# Patient Record
Sex: MALE | Race: WHITE | Employment: FULL TIME | URBAN - METROPOLITAN AREA
[De-identification: names, ages, dates, MRNs, and addresses within clinical notes are randomized per-mention and may not be internally consistent; named-entity substitution may affect disease eponyms.]

---

## 2017-05-18 ENCOUNTER — DOCUMENTATION ONLY (OUTPATIENT)
Dept: LAB | Facility: CLINIC | Age: 67
End: 2017-05-18

## 2017-05-18 DIAGNOSIS — Z00.00 ENCOUNTER FOR ROUTINE ADULT HEALTH EXAMINATION WITHOUT ABNORMAL FINDINGS: Primary | ICD-10-CM

## 2017-05-18 NOTE — PROGRESS NOTES
Pt is coming for PV physical lab on 5/26/17. I have pended future orders. Please review, associate diagnosis, and sign pended order.     Thanks, Lab

## 2017-05-30 DIAGNOSIS — Z00.00 ENCOUNTER FOR ROUTINE ADULT HEALTH EXAMINATION WITHOUT ABNORMAL FINDINGS: ICD-10-CM

## 2017-05-30 LAB
ALBUMIN SERPL-MCNC: 3.9 G/DL (ref 3.4–5)
ALP SERPL-CCNC: 90 U/L (ref 40–150)
ALT SERPL W P-5'-P-CCNC: 19 U/L (ref 0–70)
ANION GAP SERPL CALCULATED.3IONS-SCNC: 7 MMOL/L (ref 3–14)
AST SERPL W P-5'-P-CCNC: 19 U/L (ref 0–45)
BILIRUB SERPL-MCNC: 1 MG/DL (ref 0.2–1.3)
BUN SERPL-MCNC: 20 MG/DL (ref 7–30)
CALCIUM SERPL-MCNC: 8.6 MG/DL (ref 8.5–10.1)
CHLORIDE SERPL-SCNC: 105 MMOL/L (ref 94–109)
CHOLEST SERPL-MCNC: 198 MG/DL
CO2 SERPL-SCNC: 29 MMOL/L (ref 20–32)
CREAT SERPL-MCNC: 0.84 MG/DL (ref 0.66–1.25)
ERYTHROCYTE [DISTWIDTH] IN BLOOD BY AUTOMATED COUNT: 12.9 % (ref 10–15)
GFR SERPL CREATININE-BSD FRML MDRD: NORMAL ML/MIN/1.7M2
GLUCOSE SERPL-MCNC: 77 MG/DL (ref 70–99)
HCT VFR BLD AUTO: 44.5 % (ref 40–53)
HDLC SERPL-MCNC: 70 MG/DL
HGB BLD-MCNC: 14.6 G/DL (ref 13.3–17.7)
LDLC SERPL CALC-MCNC: 121 MG/DL
MCH RBC QN AUTO: 30.8 PG (ref 26.5–33)
MCHC RBC AUTO-ENTMCNC: 32.8 G/DL (ref 31.5–36.5)
MCV RBC AUTO: 94 FL (ref 78–100)
NONHDLC SERPL-MCNC: 128 MG/DL
PLATELET # BLD AUTO: 217 10E9/L (ref 150–450)
POTASSIUM SERPL-SCNC: 3.9 MMOL/L (ref 3.4–5.3)
PROT SERPL-MCNC: 6.9 G/DL (ref 6.8–8.8)
PSA SERPL-MCNC: 0.01 UG/L (ref 0–4)
RBC # BLD AUTO: 4.74 10E12/L (ref 4.4–5.9)
SODIUM SERPL-SCNC: 141 MMOL/L (ref 133–144)
TRIGL SERPL-MCNC: 37 MG/DL
WBC # BLD AUTO: 3.5 10E9/L (ref 4–11)

## 2017-05-30 PROCEDURE — 86803 HEPATITIS C AB TEST: CPT | Performed by: PREVENTIVE MEDICINE

## 2017-05-30 PROCEDURE — 36415 COLL VENOUS BLD VENIPUNCTURE: CPT | Performed by: PREVENTIVE MEDICINE

## 2017-05-30 PROCEDURE — 80061 LIPID PANEL: CPT | Performed by: PREVENTIVE MEDICINE

## 2017-05-30 PROCEDURE — 85027 COMPLETE CBC AUTOMATED: CPT | Performed by: PREVENTIVE MEDICINE

## 2017-05-30 PROCEDURE — 84153 ASSAY OF PSA TOTAL: CPT | Performed by: PREVENTIVE MEDICINE

## 2017-05-30 PROCEDURE — 80053 COMPREHEN METABOLIC PANEL: CPT | Performed by: PREVENTIVE MEDICINE

## 2017-05-31 LAB — HCV AB SERPL QL IA: NORMAL

## 2017-06-01 NOTE — PROGRESS NOTES
SUBJECTIVE:                                                            Jacek Beard is a 67 year old male who presents for Preventive Visit.      Are you in the first 12 months of your Medicare Part B coverage?  No    Healthy Habits:    Do you get at least three servings of calcium containing foods daily (dairy, green leafy vegetables, etc.)? No-1     Amount of exercise or daily activities, outside of work: 7 day(s) per week stays very active golfing, fitness club     Problems taking medications regularly No    Medication side effects: No    Have you had an eye exam in the past two years? yes    Do you see a dentist twice per year? yes    Do you have sleep apnea, excessive snoring or daytime drowsiness?no    COGNITIVE SCREEN  1) Repeat 3 items (Banana, Sunrise, Chair)    2) Clock draw: NORMAL  3) 3 item recall: Recalls 3 objects  Results: 3 items recalled: COGNITIVE IMPAIRMENT LESS LIKELY    Mini-CogTM Copyright S Rigo. Licensed by the author for use in [facility]; reprinted with permission (thomas@[facility]). All rights reserved.                Reviewed and updated as needed this visit by clinical staff         Reviewed and updated as needed this visit by Provider        Social History   Substance Use Topics     Smoking status: Never Smoker     Smokeless tobacco: Never Used     Alcohol use 2.4 - 3.0 oz/week     4 - 5 Standard drinks or equivalent per week      Comment: 2 beers  a couple times a week       The patient does not drink >3 drinks per day nor >7 drinks per week.    Today's PHQ-2 Score:   PHQ-2 ( 1999 Pfizer) 5/24/2016 6/27/2014   Q1: Little interest or pleasure in doing things 0 0   Q2: Feeling down, depressed or hopeless 0 0   PHQ-2 Score 0 0       Do you feel safe in your environment - YES    Do you have a Health Care Directive?: No: Advance care planning reviewed with patient; information given to patient to review.    Current providers sharing in care for this patient include:    Patient Care Team:  Corey Bhatia MD as PCP - General (Internal Medicine)      Hearing impairment: Yes, slight decreased hearing.R>L    Ability to successfully perform activities of daily living: Yes, no assistance needed     Fall risk:0    Home safety:  none identified      The following health maintenance items are reviewed in Epic and correct as of today:  Health Maintenance   Topic Date Due     FALL RISK ASSESSMENT  02/25/2017     PNEUMOCOCCAL (1 of 2 - PCV13) 02/25/2017     COLON CANCER SCREEN (SYSTEM ASSIGNED)  07/01/2017     INFLUENZA VACCINE (SYSTEM ASSIGNED)  09/01/2017     ADVANCE DIRECTIVE PLANNING Q5 YRS  10/18/2017     LIPID SCREEN Q5 YR MALE (SYSTEM ASSIGNED)  05/30/2022     TETANUS IMMUNIZATION (SYSTEM ASSIGNED)  12/21/2025     AORTIC ANEURYSM SCREENING (SYSTEM ASSIGNED)  Completed     HEPATITIS C SCREENING  Completed              ROS:  Constitutional, HEENT, cardiovascular, pulmonary, GI, , musculoskeletal, neuro, skin, endocrine and psych systems are negative, except as otherwise noted.    Problem list, Medication list, Allergies, and Medical/Social/Surgical histories reviewed in [facility] and updated as appropriate.  OBJECTIVE:                                                            There were no vitals taken for this visit. Estimated body mass index is 21.7 kg/(m^2) as calculated from the following:    Height as of 10/28/16: 6' (1.829 m).    Weight as of 10/28/16: 160 lb (72.6 kg).  EXAM:   GENERAL: healthy, alert and no distress  EYES: Eyes grossly normal to inspection, PERRL and conjunctivae and sclerae normal  HENT: ear canals and TM's normal, nose and mouth without ulcers or lesions  NECK: no adenopathy, no asymmetry, masses, or scars and thyroid normal to palpation  RESP: lungs clear to auscultation - no rales, rhonchi or wheezes  CV: regular rate and rhythm, normal S1 S2, no S3 or S4, no murmur, click or rub, no peripheral edema and peripheral pulses strong  ABDOMEN:  soft, nontender, no hepatosplenomegaly, no masses and bowel sounds normal  MS: no gross musculoskeletal defects noted, no edema  SKIN: no suspicious lesions or rashes  NEURO: Normal strength and tone, mentation intact and speech normal  PSYCH: mentation appears normal, affect normal/bright    ASSESSMENT / PLAN:                                                            1. Screen for colon cancer    - GASTROENTEROLOGY ADULT REF PROCEDURE ONLY    2. Need for prophylactic vaccination against Streptococcus pneumoniae (pneumococcus)      3. Routine history and physical examination of adult    - EKG 12-lead complete w/read - Clinics    End of Life Planning:  Patient currently has an advanced directive: Yes.  Practitioner is supportive of decision.    COUNSELING:  Reviewed preventive health counseling, as reflected in patient instructions       Regular exercise       Healthy diet/nutrition       Vision screening       Hearing screening       Dental care       Safe sex practices/STD prevention       Hepatitis C screening       HIV screening for high risk patient       Consider lung cancer screening for ages 55-80 years and 30 pack-year smoking history        Colon cancer screening       Prostate cancer screening        Estimated body mass index is 21.7 kg/(m^2) as calculated from the following:    Height as of 10/28/16: 6' (1.829 m).    Weight as of 10/28/16: 160 lb (72.6 kg).     reports that he has never smoked. He has never used smokeless tobacco.      Appropriate preventive services were discussed with this patient, including applicable screening as appropriate for cardiovascular disease, diabetes, osteopenia/osteoporosis, and glaucoma.  As appropriate for age/gender, discussed screening for colorectal cancer, prostate cancer, breast cancer, and cervical cancer. Checklist reviewing preventive services available has been given to the patient.    Reviewed patients plan of care and provided an AVS. The Basic Care Plan  (routine screening as documented in Health Maintenance) for Jacek meets the Care Plan requirement. This Care Plan has been established and reviewed with the Patient.    Counseling Resources:  ATP IV Guidelines  Pooled Cohorts Equation Calculator  Breast Cancer Risk Calculator  FRAX Risk Assessment  ICSI Preventive Guidelines  Dietary Guidelines for Americans, 2010  USDA's MyPlate  ASA Prophylaxis  Lung CA Screening    Corey Bhatia MD, MD  [facility]

## 2017-06-02 ENCOUNTER — OFFICE VISIT (OUTPATIENT)
Dept: FAMILY MEDICINE | Facility: CLINIC | Age: 67
End: 2017-06-02
Payer: MEDICARE

## 2017-06-02 VITALS
HEIGHT: 71 IN | DIASTOLIC BLOOD PRESSURE: 83 MMHG | BODY MASS INDEX: 22.26 KG/M2 | TEMPERATURE: 97.8 F | OXYGEN SATURATION: 99 % | SYSTOLIC BLOOD PRESSURE: 124 MMHG | HEART RATE: 74 BPM | WEIGHT: 159 LBS

## 2017-06-02 DIAGNOSIS — Z00.00 ROUTINE HISTORY AND PHYSICAL EXAMINATION OF ADULT: Primary | ICD-10-CM

## 2017-06-02 DIAGNOSIS — H61.20 WAX IN EAR: ICD-10-CM

## 2017-06-02 DIAGNOSIS — Z23 NEED FOR PROPHYLACTIC VACCINATION AGAINST STREPTOCOCCUS PNEUMONIAE (PNEUMOCOCCUS): ICD-10-CM

## 2017-06-02 DIAGNOSIS — Z12.11 SCREEN FOR COLON CANCER: ICD-10-CM

## 2017-06-02 PROCEDURE — 99397 PER PM REEVAL EST PAT 65+ YR: CPT | Mod: 25 | Performed by: PREVENTIVE MEDICINE

## 2017-06-02 PROCEDURE — G0009 ADMIN PNEUMOCOCCAL VACCINE: HCPCS | Performed by: PREVENTIVE MEDICINE

## 2017-06-02 PROCEDURE — 69210 REMOVE IMPACTED EAR WAX UNI: CPT | Performed by: PREVENTIVE MEDICINE

## 2017-06-02 PROCEDURE — 90670 PCV13 VACCINE IM: CPT | Performed by: PREVENTIVE MEDICINE

## 2017-06-02 PROCEDURE — 93000 ELECTROCARDIOGRAM COMPLETE: CPT | Mod: 59 | Performed by: PREVENTIVE MEDICINE

## 2017-06-02 NOTE — NURSING NOTE
Jacek Beard is a 67 year old male who presents today for Ear Wash. with the complaint of wax.    Ear exam showing wax occlusion in the left ear.   The patients ear(s) were irrigated using a Elephant Bottle with moderate amount of wax extracted.  Patient tolerated procedure well.    Patient instructed to irrigate ears with warm water daily.    Outcome:patients eardrum was visible and hearing improved.  Yanely Fraga CMA

## 2017-06-02 NOTE — MR AVS SNAPSHOT
After Visit Summary   6/2/2017    Jacek Beard    MRN: [id number]           Patient Information     Date Of Birth          1950        Visit Information        Provider Department      6/2/2017 10:30 AM Corey Bhatia MD [facility]        Today's Diagnoses     Screen for colon cancer        Need for prophylactic vaccination against Streptococcus pneumoniae (pneumococcus)          Care Instructions      Preventive Health Recommendations:       Male Ages 65 and over    Yearly exam:             See your health care provider every year in order to  o   Review health changes.   o   Discuss preventive care.    o   Review your medicines if your doctor has prescribed any.    Talk with your health care provider about whether you should have a test to screen for prostate cancer (PSA).    Every 3 years, have a diabetes test (fasting glucose). If you are at risk for diabetes, you should have this test more often.    Every 5 years, have a cholesterol test. Have this test more often if you are at risk for high cholesterol or heart disease.     Every 10 years, have a colonoscopy. Or, have a yearly FIT test (stool test). These exams will check for colon cancer.    Talk to with your health care provider about screening for Abdominal Aortic Aneurysm if you have a family history of AAA or have a history of smoking.  Shots:     Get a flu shot each year.     Get a tetanus shot every 10 years.     Talk to your doctor about your pneumonia vaccines. There are now two you should receive - Pneumovax (PPSV 23) and Prevnar (PCV 13).    Talk to your doctor about a shingles vaccine.     Talk to your doctor about the hepatitis B vaccine.  Nutrition:     Eat at least 5 servings of fruits and vegetables each day.     Eat whole-grain bread, whole-wheat pasta and brown rice instead of white grains and rice.     Talk to your doctor about Calcium and Vitamin D.   Lifestyle    Exercise for at least  "150 minutes a week (30 minutes a day, 5 days a week). This will help you control your weight and prevent disease.     Limit alcohol to one drink per day.     No smoking.     Wear sunscreen to prevent skin cancer.     See your dentist every six months for an exam and cleaning.     See your eye doctor every 1 to 2 years to screen for conditions such as glaucoma, macular degeneration and cataracts.          Follow-ups after your visit        Who to contact     If you have questions or need follow up information about today's clinic visit or your schedule please contact [facility] directly at [phone].  Normal or non-critical lab and imaging results will be communicated to you by ColoraderdamÂ®hart, letter or phone within 4 business days after the clinic has received the results. If you do not hear from us within 7 days, please contact the clinic through Lighterat or phone. If you have a critical or abnormal lab result, we will notify you by phone as soon as possible.  Submit refill requests through [employer] or call your pharmacy and they will forward the refill request to us. Please allow 3 business days for your refill to be completed.          Additional Information About Your Visit        MyChart Information     [employer] gives you secure access to your electronic health record. If you see a primary care provider, you can also send messages to your care team and make appointments. If you have questions, please call your primary care clinic.  If you do not have a primary care provider, please call [phone] and they will assist you.        Care EveryWhere ID     This is your Care EveryWhere ID. This could be used by other organizations to access your [place] medical records  [id number]        Your Vitals Were     Pulse Temperature Height Pulse Oximetry BMI (Body Mass Index)       74 97.8  F (36.6  C) (Tympanic) 5' 11.3\" (1.811 m) 99% 21.99 kg/m2        Blood Pressure from Last 3 Encounters:   06/02/17 124/83 "   10/28/16 132/90   05/24/16 126/77    Weight from Last 3 Encounters:   06/02/17 159 lb (72.1 kg)   10/28/16 160 lb (72.6 kg)   05/24/16 160 lb (72.6 kg)              Today, you had the following     No orders found for display       Primary Care Provider Office Phone # Fax #    Corey Cody Bhatia [phone]       [facility] [address] [facility] [address]  [facility] [address]        Thank you!     Thank you for choosing [facility]  for your care. Our goal is always to provide you with excellent care. Hearing back from our patients is one way we can continue to improve our services. Please take a few minutes to complete the written survey that you may receive in the mail after your visit with us. Thank you!             Your Updated Medication List - Protect others around you: Learn how to safely use, store and throw away your medicines at www.disposemymeds.org.          This list is accurate as of: 6/2/17 10:40 AM.  Always use your most recent med list.                   Brand Name Dispense Instructions for use    desonide 0.05 % cream    DESOWEN     Apply 1 applicator topically daily as needed (face and ears)       Krill Oil Caps      Take 1 capsule by mouth daily       PROSTATE Tabs      Take 1 tablet by mouth daily       triamcinolone 0.1 % cream    KENALOG     Apply 0.5 inches topically once a week. As needed  Pt avoids over use

## 2017-06-02 NOTE — NURSING NOTE
"Chief Complaint   Patient presents with     Wellness Visit       Initial /83 (BP Location: Left arm, Patient Position: Chair, Cuff Size: Adult Regular)  Pulse 74  Temp 97.8  F (36.6  C) (Tympanic)  Ht 5' 11.3\" (1.811 m)  Wt 159 lb (72.1 kg)  SpO2 99%  BMI 21.99 kg/m2 Estimated body mass index is 21.99 kg/(m^2) as calculated from the following:    Height as of this encounter: 5' 11.3\" (1.811 m).    Weight as of this encounter: 159 lb (72.1 kg).  Medication Reconciliation: complete   Yanely Hong- MANOLO        "

## 2017-10-12 ENCOUNTER — HOSPITAL ENCOUNTER (OUTPATIENT)
Facility: CLINIC | Age: 67
Discharge: HOME | End: 2017-10-12
Attending: INTERNAL MEDICINE | Admitting: INTERNAL MEDICINE
Payer: MEDICARE

## 2017-10-12 ENCOUNTER — SURGERY (OUTPATIENT)
Age: 67
End: 2017-10-12

## 2017-10-12 VITALS
WEIGHT: 155 LBS | OXYGEN SATURATION: 99 % | SYSTOLIC BLOOD PRESSURE: 113 MMHG | RESPIRATION RATE: 10 BRPM | BODY MASS INDEX: 21.7 KG/M2 | DIASTOLIC BLOOD PRESSURE: 83 MMHG | HEIGHT: 71 IN

## 2017-10-12 LAB — COLONOSCOPY: NORMAL

## 2017-10-12 PROCEDURE — 88305 TISSUE EXAM BY PATHOLOGIST: CPT | Mod: 26 | Performed by: INTERNAL MEDICINE

## 2017-10-12 PROCEDURE — 25000128 H RX IP 250 OP 636: Performed by: INTERNAL MEDICINE

## 2017-10-12 PROCEDURE — 88305 TISSUE EXAM BY PATHOLOGIST: CPT | Performed by: INTERNAL MEDICINE

## 2017-10-12 PROCEDURE — 45380 COLONOSCOPY AND BIOPSY: CPT | Performed by: INTERNAL MEDICINE

## 2017-10-12 PROCEDURE — G0500 MOD SEDAT ENDO SERVICE >5YRS: HCPCS | Performed by: INTERNAL MEDICINE

## 2017-10-12 RX ORDER — FENTANYL CITRATE 50 UG/ML
INJECTION, SOLUTION INTRAMUSCULAR; INTRAVENOUS PRN
Status: DISCONTINUED | OUTPATIENT
Start: 2017-10-12 | End: 2017-10-12 | Stop reason: HOSPADM

## 2017-10-12 RX ORDER — SODIUM CHLORIDE 9 MG/ML
INJECTION, SOLUTION INTRAVENOUS CONTINUOUS PRN
Status: DISCONTINUED | OUTPATIENT
Start: 2017-10-12 | End: 2017-10-12 | Stop reason: HOSPADM

## 2017-10-12 RX ORDER — LIDOCAINE 40 MG/G
CREAM TOPICAL
Status: DISCONTINUED | OUTPATIENT
Start: 2017-10-12 | End: 2017-10-12 | Stop reason: HOSPADM

## 2017-10-12 RX ORDER — ONDANSETRON 2 MG/ML
4 INJECTION INTRAMUSCULAR; INTRAVENOUS
Status: DISCONTINUED | OUTPATIENT
Start: 2017-10-12 | End: 2017-10-12 | Stop reason: HOSPADM

## 2017-10-12 RX ADMIN — MIDAZOLAM HYDROCHLORIDE 2 MG: 1 INJECTION, SOLUTION INTRAMUSCULAR; INTRAVENOUS at 09:12

## 2017-10-12 RX ADMIN — MIDAZOLAM HYDROCHLORIDE 2 MG: 1 INJECTION, SOLUTION INTRAMUSCULAR; INTRAVENOUS at 09:09

## 2017-10-12 RX ADMIN — SODIUM CHLORIDE 125 ML/HR: 9 INJECTION, SOLUTION INTRAVENOUS at 09:00

## 2017-10-12 RX ADMIN — FENTANYL CITRATE 100 MCG: 50 INJECTION, SOLUTION INTRAMUSCULAR; INTRAVENOUS at 09:10

## 2017-10-16 LAB — COPATH REPORT: NORMAL

## 2018-05-25 ENCOUNTER — OFFICE VISIT (OUTPATIENT)
Dept: FAMILY MEDICINE | Facility: CLINIC | Age: 68
End: 2018-05-25
Payer: COMMERCIAL

## 2018-05-25 VITALS
WEIGHT: 157 LBS | HEART RATE: 94 BPM | SYSTOLIC BLOOD PRESSURE: 114 MMHG | BODY MASS INDEX: 21.98 KG/M2 | RESPIRATION RATE: 18 BRPM | OXYGEN SATURATION: 98 % | TEMPERATURE: 99.1 F | HEIGHT: 71 IN | DIASTOLIC BLOOD PRESSURE: 67 MMHG

## 2018-05-25 DIAGNOSIS — E78.5 HYPERLIPIDEMIA LDL GOAL <160: ICD-10-CM

## 2018-05-25 DIAGNOSIS — J98.01 BRONCHOSPASM: ICD-10-CM

## 2018-05-25 DIAGNOSIS — M54.81 OCCIPITAL NEURALGIA OF RIGHT SIDE: ICD-10-CM

## 2018-05-25 DIAGNOSIS — L03.032 CELLULITIS OF TOE OF LEFT FOOT: ICD-10-CM

## 2018-05-25 DIAGNOSIS — C61 PROSTATE CANCER (H): ICD-10-CM

## 2018-05-25 DIAGNOSIS — L60.0 INGROWN NAIL: Primary | ICD-10-CM

## 2018-05-25 PROCEDURE — 99214 OFFICE O/P EST MOD 30 MIN: CPT | Performed by: INTERNAL MEDICINE

## 2018-05-25 RX ORDER — MUPIROCIN 20 MG/G
OINTMENT TOPICAL
COMMUNITY
Start: 2018-05-23 | End: 2020-11-19

## 2018-05-25 RX ORDER — SULFAMETHOXAZOLE/TRIMETHOPRIM 800-160 MG
TABLET ORAL
COMMUNITY
Start: 2018-05-21 | End: 2018-06-22

## 2018-05-25 RX ORDER — ALBUTEROL SULFATE 90 UG/1
1-2 AEROSOL, METERED RESPIRATORY (INHALATION) EVERY 6 HOURS PRN
Qty: 1 INHALER | Refills: 11 | Status: SHIPPED | OUTPATIENT
Start: 2018-05-25 | End: 2023-11-22

## 2018-05-25 NOTE — MR AVS SNAPSHOT
After Visit Summary   5/25/2018    Jacek Beard    MRN: [id number]           Patient Information     Date Of Birth          1950        Visit Information        Provider Department      5/25/2018 10:30 AM Jay Resendez MD [facility]        Today's Diagnoses     Ingrown nail    -  1    Cellulitis of toe of left foot        Occipital neuralgia of right side        Bronchospasm           Follow-ups after your visit        Additional Services     PODIATRY/FOOT & ANKLE SURGERY REFERRAL       Your provider has referred you to: [facility]: [facility] [phone]   [email or web address]    Dr. Dhaval Farias        Please be aware that coverage of these services is subject to the terms and limitations of your health insurance plan.  Call member services at your health plan with any benefit or coverage questions.      Please bring the following to your appointment:  >>   Any x-rays, CTs or MRIs which have been performed.  Contact the facility where they were done to arrange for  prior to your scheduled appointment.    >>   List of current medications   >>   This referral request   >>   Any documents/labs given to you for this referral                  Who to contact     If you have questions or need follow up information about today's clinic visit or your schedule please contact [facility] directly at [phone].  Normal or non-critical lab and imaging results will be communicated to you by MyChart, letter or phone within 4 business days after the clinic has received the results. If you do not hear from us within 7 days, please contact the clinic through MyChart or phone. If you have a critical or abnormal lab result, we will notify you by phone as soon as possible.  Submit refill requests through [employer] or call your pharmacy and they will forward the refill request to us. Please allow 3 business days for your refill to be  "completed.          Additional Information About Your Visit        Medalog[employer]PromptCare Information     [employer] gives you secure access to your electronic health record. If you see a primary care provider, you can also send messages to your care team and make appointments. If you have questions, please call your primary care clinic.  If you do not have a primary care provider, please call [phone] and they will assist you.        Care EveryWhere ID     This is your Care EveryWhere ID. This could be used by other organizations to access your [place] medical records  [id number]        Your Vitals Were     Pulse Temperature Respirations Height Pulse Oximetry BMI (Body Mass Index)    94 99.1  F (37.3  C) (Tympanic) 18 5' 11\" (1.803 m) 98% 21.9 kg/m2       Blood Pressure from Last 3 Encounters:   05/25/18 114/67   10/12/17 113/83   06/02/17 124/83    Weight from Last 3 Encounters:   05/25/18 157 lb (71.2 kg)   10/12/17 155 lb (70.3 kg)   06/02/17 159 lb (72.1 kg)              We Performed the Following     PODIATRY/FOOT & ANKLE SURGERY REFERRAL          Today's Medication Changes          These changes are accurate as of 5/25/18 11:08 AM.  If you have any questions, ask your nurse or doctor.               These medicines have changed or have updated prescriptions.        Dose/Directions    albuterol 108 (90 Base) MCG/ACT Inhaler   Commonly known as:  VENTOLIN HFA   This may have changed:    - medication strength  - how much to take  - when to take this  - reasons to take this   Used for:  Bronchospasm   Changed by:  Jay Resendez MD        Dose:  1-2 puff   Inhale 1-2 puffs into the lungs every 6 hours as needed   Quantity:  1 Inhaler   Refills:  11         Stop taking these medicines if you haven't already. Please contact your care team if you have questions.     MAGNESIUM OXIDE PO   Stopped by:  Jay Resendez MD                Where to get your medicines      These medications were sent to [id number] - " [place] - [address] AT [address] [place] [address] & [facility]  [address] [place] [address]    Hours:  24-hours Phone:  [phone]     albuterol 108 (90 Base) MCG/ACT Inhaler                Primary Care Provider Office Phone # Fax #    Jay GASPAR MD [facility] [phone] [address]  [place] [address]        Equal Access to Services     JACK [facility]EMIE : Hadii aad ku hadasho Soomaali, waaxda luqadaha, qaybta kaalmada adeegyada, waxay idiin hayaan adeeg kharash la'aan . So [facility] [place] [facility] [phone].    ATENCIÓN: Si habla español, tiene a pham disposición servicios gratuitos de asistencia lingüística. [facility] [phone].    We comply with applicable federal civil rights laws and [place] laws. We do not discriminate on the basis of race, color, national origin, age, disability, sex, sexual orientation, or gender identity.            Thank you!     Thank you for choosing [facility]  for your care. Our goal is always to provide you with excellent care. Hearing back from our patients is one way we can continue to improve our services. Please take a few minutes to complete the written survey that you may receive in the mail after your visit with us. Thank you!             Your Updated Medication List - Protect others around you: Learn how to safely use, store and throw away your medicines at www.disposemymeds.org.          This list is accurate as of 5/25/18 11:08 AM.  Always use your most recent med list.                   Brand Name Dispense Instructions for use Diagnosis    albuterol 108 (90 Base) MCG/ACT Inhaler    VENTOLIN HFA    1 Inhaler    Inhale 1-2 puffs into the lungs every 6 hours as needed    Bronchospasm       COENZYME Q-10 PO           desonide 0.05 % cream    DESOWEN     Apply 1 applicator topically daily as needed (face and ears)        Krill Oil Caps      Take 1 capsule by mouth daily        mupirocin 2 % ointment    BACTROBAN          PROSTATE Tabs      Take 1 tablet  by mouth daily        sulfamethoxazole-trimethoprim 800-160 MG per tablet    BACTRIM DS/SEPTRA DS          triamcinolone 0.1 % cream    KENALOG     Apply 0.5 inches topically once a week. As needed  Pt avoids over use

## 2018-06-14 DIAGNOSIS — C61 PROSTATE CANCER (H): ICD-10-CM

## 2018-06-14 DIAGNOSIS — E78.5 HYPERLIPIDEMIA LDL GOAL <160: ICD-10-CM

## 2018-06-14 LAB
ALBUMIN SERPL-MCNC: 3.6 G/DL (ref 3.4–5)
ALP SERPL-CCNC: 99 U/L (ref 40–150)
ALT SERPL W P-5'-P-CCNC: 161 U/L (ref 0–70)
ANION GAP SERPL CALCULATED.3IONS-SCNC: 7 MMOL/L (ref 3–14)
AST SERPL W P-5'-P-CCNC: 63 U/L (ref 0–45)
BILIRUB SERPL-MCNC: 1.2 MG/DL (ref 0.2–1.3)
BUN SERPL-MCNC: 16 MG/DL (ref 7–30)
CALCIUM SERPL-MCNC: 8.8 MG/DL (ref 8.5–10.1)
CHLORIDE SERPL-SCNC: 109 MMOL/L (ref 94–109)
CHOLEST SERPL-MCNC: 210 MG/DL
CO2 SERPL-SCNC: 29 MMOL/L (ref 20–32)
CREAT SERPL-MCNC: 0.99 MG/DL (ref 0.66–1.25)
ERYTHROCYTE [DISTWIDTH] IN BLOOD BY AUTOMATED COUNT: 13.2 % (ref 10–15)
GFR SERPL CREATININE-BSD FRML MDRD: 76 ML/MIN/1.7M2
GLUCOSE SERPL-MCNC: 88 MG/DL (ref 70–99)
HCT VFR BLD AUTO: 43.3 % (ref 40–53)
HDLC SERPL-MCNC: 69 MG/DL
HGB BLD-MCNC: 13.7 G/DL (ref 13.3–17.7)
LDLC SERPL CALC-MCNC: 130 MG/DL
MCH RBC QN AUTO: 30.6 PG (ref 26.5–33)
MCHC RBC AUTO-ENTMCNC: 31.6 G/DL (ref 31.5–36.5)
MCV RBC AUTO: 97 FL (ref 78–100)
NONHDLC SERPL-MCNC: 141 MG/DL
PLATELET # BLD AUTO: 238 10E9/L (ref 150–450)
POTASSIUM SERPL-SCNC: 3.8 MMOL/L (ref 3.4–5.3)
PROT SERPL-MCNC: 6.6 G/DL (ref 6.8–8.8)
PSA SERPL-MCNC: 0.01 UG/L (ref 0–4)
RBC # BLD AUTO: 4.47 10E12/L (ref 4.4–5.9)
SODIUM SERPL-SCNC: 145 MMOL/L (ref 133–144)
TRIGL SERPL-MCNC: 56 MG/DL
WBC # BLD AUTO: 2.5 10E9/L (ref 4–11)

## 2018-06-14 PROCEDURE — 36415 COLL VENOUS BLD VENIPUNCTURE: CPT | Performed by: INTERNAL MEDICINE

## 2018-06-14 PROCEDURE — 80053 COMPREHEN METABOLIC PANEL: CPT | Performed by: INTERNAL MEDICINE

## 2018-06-14 PROCEDURE — 80061 LIPID PANEL: CPT | Performed by: INTERNAL MEDICINE

## 2018-06-14 PROCEDURE — 84153 ASSAY OF PSA TOTAL: CPT | Performed by: INTERNAL MEDICINE

## 2018-06-14 PROCEDURE — 85027 COMPLETE CBC AUTOMATED: CPT | Performed by: INTERNAL MEDICINE

## 2018-06-17 NOTE — PROGRESS NOTES
"The following letter pertains to your most recent diagnostic tests:    -Your prostate specific antigen (PSA) test result returned normal.     -Kidney function is normal for you (Creatinine, GFR), Sodium is normal, Potassium is normal for you, Calcium is normal for you, Glucose (blood sugar) is normal for you.      -Your liver tests returned elevated.      -Your total cholesterol is 210 which is above your goal of total cholesterol less than 200.    -Your triglycerides are 56 which are at your goal of triglycerides less than 150.    -Your HDL or \"good cholesterol\" is 69 which is at your goal of HDL cholesterol greater than 40.    -Your LDL cholesterol or \"bad cholesterol\" is 130 which is at your goal of LDL cholesterol less than <130.  Your LDL goal is based on your risk factors for artery disease.     -Your complete blood counts including your hemoglobin returned normal for you with the exception of a low white blood cell count.      Bottom line:  Your lab tests show some non emergent abnormalities that need to be discussed at your upcoming office visit appointment.          Sincerely,    Dr. Resendez"

## 2018-06-21 ASSESSMENT — ACTIVITIES OF DAILY LIVING (ADL)
I_NEED_ASSISTANCE_FOR_THE_FOLLOWING_DAILY_ACTIVITIES:: NO ASSISTANCE IS NEEDED
CURRENT_FUNCTION: NO ASSISTANCE NEEDED

## 2018-06-22 ENCOUNTER — OFFICE VISIT (OUTPATIENT)
Dept: FAMILY MEDICINE | Facility: CLINIC | Age: 68
End: 2018-06-22
Payer: COMMERCIAL

## 2018-06-22 VITALS
HEART RATE: 74 BPM | HEIGHT: 71 IN | OXYGEN SATURATION: 100 % | BODY MASS INDEX: 21.7 KG/M2 | TEMPERATURE: 97.8 F | DIASTOLIC BLOOD PRESSURE: 75 MMHG | SYSTOLIC BLOOD PRESSURE: 114 MMHG | WEIGHT: 155 LBS

## 2018-06-22 DIAGNOSIS — E78.5 HYPERLIPIDEMIA LDL GOAL <160: ICD-10-CM

## 2018-06-22 DIAGNOSIS — Z00.00 ROUTINE GENERAL MEDICAL EXAMINATION AT A HEALTH CARE FACILITY: Primary | ICD-10-CM

## 2018-06-22 DIAGNOSIS — Z23 NEED FOR VACCINATION: ICD-10-CM

## 2018-06-22 DIAGNOSIS — R74.8 ELEVATED LIVER ENZYMES: ICD-10-CM

## 2018-06-22 DIAGNOSIS — D72.819 LEUKOPENIA, UNSPECIFIED TYPE: ICD-10-CM

## 2018-06-22 DIAGNOSIS — C61 PROSTATE CANCER (H): ICD-10-CM

## 2018-06-22 PROCEDURE — 99397 PER PM REEVAL EST PAT 65+ YR: CPT | Mod: 25 | Performed by: INTERNAL MEDICINE

## 2018-06-22 PROCEDURE — 90732 PPSV23 VACC 2 YRS+ SUBQ/IM: CPT | Performed by: INTERNAL MEDICINE

## 2018-06-22 PROCEDURE — G0009 ADMIN PNEUMOCOCCAL VACCINE: HCPCS | Performed by: INTERNAL MEDICINE

## 2018-06-22 ASSESSMENT — ACTIVITIES OF DAILY LIVING (ADL): CURRENT_FUNCTION: NO ASSISTANCE NEEDED

## 2018-06-22 NOTE — NURSING NOTE
Prior to injection verified patient identity using patient's name and date of birth.  Due to injection administration, patient instructed to remain in clinic for 15 minutes  afterwards, and to report any adverse reaction to me immediately.    Screening Questionnaire for Adult Immunization    Are you sick today?   No   Do you have allergies to medications, food, a vaccine component or latex?   No   Have you ever had a serious reaction after receiving a vaccination?   No   Do you have a long-term health problem with heart disease, lung disease, asthma, kidney disease, metabolic disease (e.g. diabetes), anemia, or other blood disorder?   No   Do you have cancer, leukemia, HIV/AIDS, or any other immune system problem?   No   In the past 3 months, have you taken medications that affect  your immune system, such as prednisone, other steroids, or anticancer drugs; drugs for the treatment of rheumatoid arthritis, Crohn s disease, or psoriasis; or have you had radiation treatments?   No   Have you had a seizure, or a brain or other nervous system problem?   No   During the past year, have you received a transfusion of blood or blood     products, or been given immune (gamma) globulin or antiviral drug?   No   For women: Are you pregnant or is there a chance you could become        pregnant during the next month?   No   Have you received any vaccinations in the past 4 weeks?   No     Immunization questionnaire answers were all negative.        Per orders of Dr. Resendez, injection of [id number] given by Pascual Marr. Patient instructed to remain in clinic for 15 minutes afterwards, and to report any adverse reaction to me immediately.       Screening performed by Pascual Marr on 6/22/2018 at 10:03 AM.

## 2018-06-22 NOTE — PROGRESS NOTES
SUBJECTIVE:   Jacek Beard is a 68 year old male who presents for Preventive Visit.    Are you in the first 12 months of your Medicare coverage?  No    Physical   Annual:     Getting at least 3 servings of Calcium per day::  NO    Bi-annual eye exam::  NO    Dental care twice a year::  NO    Sleep apnea or symptoms of sleep apnea::  None    Diet::  Regular (no restrictions)    Frequency of exercise::  4-5 days/week    Duration of exercise::  30-45 minutes    Taking medications regularly::  Not Applicable    Medication side effects::  Not applicable    Additional concerns today::  No    Ability to successfully perform activities of daily living: no assistance needed  Home Safety:  No safety concerns identified  Hearing Impairment: difficulty understanding soft or whispered speech        Fall risk:  Fallen 2 or more times in the past year?: No  Any fall with injury in the past year?: No    COGNITIVE SCREEN  1) Repeat 3 items (Banana, Sunrise, Chair)    2) Clock draw: NORMAL  3) 3 item recall: Recalls 3 objects  Results: 3 items recalled: COGNITIVE IMPAIRMENT LESS LIKELY    Mini-CogTM Copyright MARIBEL Sierra. Licensed by the author for use in [facility]; reprinted with permission (soob@[facility]). All rights reserved.        Reviewed and updated as needed this visit by clinical staff  Tobacco  Allergies  Meds  Med Hx  Surg Hx  Fam Hx  Soc Hx        Reviewed and updated as needed this visit by Provider  Tobacco  Med Hx  Surg Hx  Fam Hx  Soc Hx       Social History   Substance Use Topics     Smoking status: Never Smoker     Smokeless tobacco: Never Used     Alcohol use 2.4 - 3.0 oz/week      Comment: 2 beers  a couple times a week       Alcohol Use 6/21/2018   If you drink alcohol do you typically have greater than 3 drinks per day OR greater than 7 drinks per week? No   No flowsheet data found.        He has been on several antibiotics per dermatology for his toe infection and has been taking APAP  for pain associated with his toe infection.      No abdominal pain, nausea or vomiting or jaundice     Today's PHQ-2 Score:   PHQ-2 ( 1999 Pf[employer]) 6/22/2018   Q1: Little interest or pleasure in doing things 0   Q2: Feeling down, depressed or hopeless 0   PHQ-2 Score 0   Q1: Little interest or pleasure in doing things -   Q2: Feeling down, depressed or hopeless -   PHQ-2 Score -       Do you feel safe in your environment - Yes    Do you have a Health Care Directive?: No: Advance care planning reviewed with patient; information given to patient to review.    Current providers sharing in care for this patient include:   Patient Care Team:  Jay Resendez MD as PCP - General (Internal Medicine)    The following health maintenance items are reviewed in Epic and correct as of today:  Health Maintenance   Topic Date Due     ADVANCE DIRECTIVE PLANNING Q5 YRS  10/18/2017     PNEUMOCOCCAL (2 of 2 - PPSV23) 06/02/2018     PHQ-2 Q1 YR  06/02/2018     FALL RISK ASSESSMENT  06/02/2018     INFLUENZA VACCINE (Season Ended) 09/01/2018     LIPID SCREEN Q5 YR MALE (SYSTEM ASSIGNED)  06/14/2023     TETANUS IMMUNIZATION (SYSTEM ASSIGNED)  12/21/2025     COLON CANCER SCREEN (SYSTEM ASSIGNED)  10/12/2027     AORTIC ANEURYSM SCREENING (SYSTEM ASSIGNED)  Completed     HEPATITIS C SCREENING  Completed     Patient Active Problem List   Diagnosis     Mitral valve disorder     HYPERLIPIDEMIA LDL GOAL <160     Prostate cancer (H)     Leukopenia     Advanced directives, counseling/discussion     Past Surgical History:   Procedure Laterality Date     BIOPSY  April 2010    RE: Prostate Cancer biopsies.     COLONOSCOPY  2007    due in 10 yrs     COLONOSCOPY N/A 10/12/2017    Procedure: COMBINED COLONOSCOPY, SINGLE OR MULTIPLE BIOPSY/POLYPECTOMY BY BIOPSY;  COLONOSCOPY;  Surgeon: Wilmer Mendez MD;  Location:  GI     COLONOSCOPY  2017     PROSTATE BIOPSY, NEEDLE, SATURATION SAMPLING         Social History   Substance Use Topics      "Smoking status: Never Smoker     Smokeless tobacco: Never Used     Alcohol use 2.4 - 3.0 oz/week      Comment: 2 beers  a couple times a week     Family History   Problem Relation Age of Onset     Diabetes Brother      Hypertension Brother      Diabetes Father      Type II     Prostate Cancer Father      2005     Hypertension Father      HBP Medication     Diabetes Brother      Type II     Hypertension Brother      HBP Medication     Prostate Cancer Brother      2008?     Hypertension Brother      HBP Medication     C.A.D. No family hx of      Colon Cancer No family hx of          Current Outpatient Prescriptions   Medication Sig Dispense Refill     albuterol (VENTOLIN HFA) 108 (90 Base) MCG/ACT Inhaler Inhale 1-2 puffs into the lungs every 6 hours as needed 1 Inhaler 11     COENZYME Q-10 PO        Krill Oil CAPS Take 1 capsule by mouth daily       mupirocin (BACTROBAN) 2 % ointment        Specialty Vitamins Products (PROSTATE) TABS Take 1 tablet by mouth daily       triamcinolone (KENALOG) 0.1 % cream Apply 0.5 inches topically once a week. As needed   Pt avoids over use       Allergies   Allergen Reactions     Bactrim [Sulfamethoxazole W/Trimethoprim]      Penicillins      unsure of reaction           Review of Systems  Constitutional, HEENT (He has tinnitus), cardiovascular, pulmonary, gi and gu (he has kidney stones and hematuria from previous radiation but no symptoms recently) systems are negative, except as otherwise noted.    OBJECTIVE:   /75 (BP Location: Right arm, Cuff Size: Adult Regular)  Pulse 74  Temp 97.8  F (36.6  C) (Oral)  Ht 5' 11\" (1.803 m)  Wt 155 lb (70.3 kg)  SpO2 100%  BMI 21.62 kg/m2 Estimated body mass index is 21.62 kg/(m^2) as calculated from the following:    Height as of this encounter: 5' 11\" (1.803 m).    Weight as of this encounter: 155 lb (70.3 kg).  Physical Exam  GENERAL: healthy, alert and no distress  EYES: Eyes grossly normal to inspection, PERRL and conjunctivae " and sclerae normal  HENT: ear canals and TM's normal, nose and mouth without ulcers or lesions  NECK: no adenopathy, no asymmetry, masses, or scars and thyroid normal to palpation  RESP: lungs clear to auscultation - no rales, rhonchi or wheezes  CV: regular rate and rhythm, normal S1 S2, no S3 or S4, no murmur, click or rub, no peripheral edema and peripheral pulses strong  ABDOMEN: soft, nontender, no hepatosplenomegaly, no masses and bowel sounds normal  RECTAL: normal sphincter tone, no rectal masses, prostate normal size, smooth, nontender without nodules or masses  MS: no gross musculoskeletal defects noted, no edema  SKIN: no suspicious lesions or rashes  NEURO: Normal strength and tone, mentation intact and speech normal  PSYCH: mentation appears normal, affect normal/bright    ASSESSMENT / PLAN:   1. Routine general medical examination at a health care facility      2. Hyperlipidemia LDL goal <160  Lipids OK    3. Prostate cancer (H)  PSA stable    4. Leukopenia, unspecified type  Worse WBC might be related to toe infection or drugs to treat toe infection, recheck in 3-4 weeks   - CBC with platelets; Future    5. Elevated liver enzymes  I suspect this is from antibiotics for his toe infection, recheck in 3-4 weeks, if persistent liver enzyme elevation; then check for dangerous or reversible causes   - Comprehensive metabolic panel; Future    End of Life Planning:  Patient currently has an advanced directive: No.  I have verified the patient's ablity to prepare an advanced directive/make health care decisions.  Literature was provided to assist patient in preparing an advanced directive.    COUNSELING:  Reviewed preventive health counseling, as reflected in patient instructions  Special attention given to:       Regular exercise       Healthy diet/nutrition       Immunizations    Shingrix recommended  ; P23 today            Colon cancer screening is up to date       Prostate cancer screening        Estimated  "body mass index is 21.62 kg/(m^2) as calculated from the following:    Height as of this encounter: 5' 11\" (1.803 m).    Weight as of this encounter: 155 lb (70.3 kg).     reports that he has never smoked. He has never used smokeless tobacco.      Appropriate preventive services were discussed with this patient, including applicable screening as appropriate for cardiovascular disease, diabetes, osteopenia/osteoporosis, and glaucoma.  As appropriate for age/gender, discussed screening for colorectal cancer, prostate cancer, breast cancer, and cervical cancer. Checklist reviewing preventive services available has been given to the patient.    Reviewed patients plan of care and provided an AVS. The Basic Care Plan (routine screening as documented in Health Maintenance) for Jacek meets the Care Plan requirement. This Care Plan has been established and reviewed with the Patient.    Counseling Resources:  ATP IV Guidelines  Pooled Cohorts Equation Calculator  Breast Cancer Risk Calculator  FRAX Risk Assessment  ICSI Preventive Guidelines  Dietary Guidelines for Americans, 2010  USDA's MyPlate  ASA Prophylaxis  Lung CA Screening    Jay Resendez MD  [facility]  Answers for HPI/ROS submitted by the patient on 6/21/2018   PHQ-2 Score: 0    "

## 2018-06-22 NOTE — MR AVS SNAPSHOT
"              After Visit Summary   6/22/2018    Jacek Beard    MRN: [id number]           Patient Information     Date Of Birth          1950        Visit Information        Provider Department      6/22/2018 9:00 AM Jay Resendez MD [facility]        Today's Diagnoses     Routine general medical examination at a health care facility    -  1    Hyperlipidemia LDL goal <160        Prostate cancer (H)        Leukopenia, unspecified type        Elevated liver enzymes          Care Instructions    You should get the new shingles vaccine \"SHINGRIX\" (not Zostavax) at your pharmacy.         Preventive Health Recommendations:       Male Ages 65 and over    Yearly exam:             See your health care provider every year in order to  o   Review health changes.   o   Discuss preventive care.    o   Review your medicines if your doctor has prescribed any.    Talk with your health care provider about whether you should have a test to screen for prostate cancer (PSA).    Every 3 years, have a diabetes test (fasting glucose). If you are at risk for diabetes, you should have this test more often.    Every 5 years, have a cholesterol test. Have this test more often if you are at risk for high cholesterol or heart disease.     Every 10 years, have a colonoscopy. Or, have a yearly FIT test (stool test). These exams will check for colon cancer.    Talk to with your health care provider about screening for Abdominal Aortic Aneurysm if you have a family history of AAA or have a history of smoking.  Shots:     Get a flu shot each year.     Get a tetanus shot every 10 years.     Talk to your doctor about your pneumonia vaccines. There are now two you should receive - Pneumovax (PPSV 23) and Prevnar (PCV 13).    Talk to your doctor about a shingles vaccine.     Talk to your doctor about the hepatitis B vaccine.  Nutrition:     Eat at least 5 servings of fruits and vegetables each day.     Eat whole-grain bread, " whole-wheat pasta and brown rice instead of white grains and rice.     Talk to your doctor about Calcium and Vitamin D.   Lifestyle    Exercise for at least 150 minutes a week (30 minutes a day, 5 days a week). This will help you control your weight and prevent disease.     Limit alcohol to one drink per day.     No smoking.     Wear sunscreen to prevent skin cancer.     See your dentist every six months for an exam and cleaning.     See your eye doctor every 1 to 2 years to screen for conditions such as glaucoma, macular degeneration and cataracts.          Follow-ups after your visit        Follow-up notes from your care team     Return for Lab Work.      Future tests that were ordered for you today     Open Future Orders        Priority Expected Expires Ordered    Comprehensive metabolic panel Routine 7/22/2018 6/22/2019 6/22/2018    CBC with platelets Routine 7/22/2018 6/22/2019 6/22/2018            Who to contact     If you have questions or need follow up information about today's clinic visit or your schedule please contact [facility] directly at [phone].  Normal or non-critical lab and imaging results will be communicated to you by HomeRunhart, letter or phone within 4 business days after the clinic has received the results. If you do not hear from us within 7 days, please contact the clinic through Coinapultt or phone. If you have a critical or abnormal lab result, we will notify you by phone as soon as possible.  Submit refill requests through real5D or call your pharmacy and they will forward the refill request to us. Please allow 3 business days for your refill to be completed.          Additional Information About Your Visit        real5D Information     real5D gives you secure access to your electronic health record. If you see a primary care provider, you can also send messages to your care team and make appointments. If you have questions, please call your primary care clinic.  If you do  "not have a primary care provider, please call [phone] and they will assist you.        Care EveryWhere ID     This is your Care EveryWhere ID. This could be used by other organizations to access your [facility] medical records  [id number]        Your Vitals Were     Pulse Temperature Height Pulse Oximetry BMI (Body Mass Index)       74 97.8  F (36.6  C) (Oral) 5' 11\" (1.803 m) 100% 21.62 kg/m2        Blood Pressure from Last 3 Encounters:   06/22/18 114/75   05/25/18 114/67   10/12/17 113/83    Weight from Last 3 Encounters:   06/22/18 155 lb (70.3 kg)   05/25/18 157 lb (71.2 kg)   10/12/17 155 lb (70.3 kg)                 Today's Medication Changes          These changes are accurate as of 6/22/18  9:53 AM.  If you have any questions, ask your nurse or doctor.               Stop taking these medicines if you haven't already. Please contact your care team if you have questions.     sulfamethoxazole-trimethoprim 800-160 MG per tablet   Commonly known as:  BACTRIM DS/SEPTRA DS   Stopped by:  Jay Resendez MD                    Primary Care Provider Office Phone # Fax #    aJy Resendez [phone] [address] [address]        Equal Access to Services     [facility] AH: Hadii diana jung hadasho Sonigel, waaxda luqadaha, qaybta kaaldelmy thakur . So [facility] [phone].    ATENCIÓN: Si habla español, tiene a pham disposición servicios gratuitos de asistencia lingüística. Neel al [phone].    We comply with applicable federal civil rights laws and [place] laws. We do not discriminate on the basis of race, color, national origin, age, disability, sex, sexual orientation, or gender identity.            Thank you!     Thank you for choosing [facility]  for your care. Our goal is always to provide you with excellent care. Hearing back from our patients is one way we can continue to improve our services. Please take a " few minutes to complete the written survey that you may receive in the mail after your visit with us. Thank you!             Your Updated Medication List - Protect others around you: Learn how to safely use, store and throw away your medicines at www.disposemymeds.org.          This list is accurate as of 6/22/18  9:53 AM.  Always use your most recent med list.                   Brand Name Dispense Instructions for use Diagnosis    albuterol 108 (90 Base) MCG/ACT Inhaler    VENTOLIN HFA    1 Inhaler    Inhale 1-2 puffs into the lungs every 6 hours as needed    Bronchospasm       COENZYME Q-10 PO           Krill Oil Caps      Take 1 capsule by mouth daily        mupirocin 2 % ointment    BACTROBAN          PROSTATE Tabs      Take 1 tablet by mouth daily        triamcinolone 0.1 % cream    KENALOG     Apply 0.5 inches topically once a week. As needed  Pt avoids over use

## 2018-06-22 NOTE — PATIENT INSTRUCTIONS
"You should get the new shingles vaccine \"SHINGRIX\" (not Zostavax) at your pharmacy.         Preventive Health Recommendations:       Male Ages 65 and over    Yearly exam:             See your health care provider every year in order to  o   Review health changes.   o   Discuss preventive care.    o   Review your medicines if your doctor has prescribed any.    Talk with your health care provider about whether you should have a test to screen for prostate cancer (PSA).    Every 3 years, have a diabetes test (fasting glucose). If you are at risk for diabetes, you should have this test more often.    Every 5 years, have a cholesterol test. Have this test more often if you are at risk for high cholesterol or heart disease.     Every 10 years, have a colonoscopy. Or, have a yearly FIT test (stool test). These exams will check for colon cancer.    Talk to with your health care provider about screening for Abdominal Aortic Aneurysm if you have a family history of AAA or have a history of smoking.  Shots:     Get a flu shot each year.     Get a tetanus shot every 10 years.     Talk to your doctor about your pneumonia vaccines. There are now two you should receive - Pneumovax (PPSV 23) and Prevnar (PCV 13).    Talk to your doctor about a shingles vaccine.     Talk to your doctor about the hepatitis B vaccine.  Nutrition:     Eat at least 5 servings of fruits and vegetables each day.     Eat whole-grain bread, whole-wheat pasta and brown rice instead of white grains and rice.     Talk to your doctor about Calcium and Vitamin D.   Lifestyle    Exercise for at least 150 minutes a week (30 minutes a day, 5 days a week). This will help you control your weight and prevent disease.     Limit alcohol to one drink per day.     No smoking.     Wear sunscreen to prevent skin cancer.     See your dentist every six months for an exam and cleaning.     See your eye doctor every 1 to 2 years to screen for conditions such as glaucoma, " macular degeneration and cataracts.

## 2018-07-31 DIAGNOSIS — R74.8 ELEVATED LIVER ENZYMES: ICD-10-CM

## 2018-07-31 DIAGNOSIS — D72.819 LEUKOPENIA, UNSPECIFIED TYPE: ICD-10-CM

## 2018-07-31 LAB
ALBUMIN SERPL-MCNC: 3.9 G/DL (ref 3.4–5)
ALP SERPL-CCNC: 83 U/L (ref 40–150)
ALT SERPL W P-5'-P-CCNC: 29 U/L (ref 0–70)
ANION GAP SERPL CALCULATED.3IONS-SCNC: 8 MMOL/L (ref 3–14)
AST SERPL W P-5'-P-CCNC: 25 U/L (ref 0–45)
BILIRUB SERPL-MCNC: 1.3 MG/DL (ref 0.2–1.3)
BUN SERPL-MCNC: 21 MG/DL (ref 7–30)
CALCIUM SERPL-MCNC: 8.9 MG/DL (ref 8.5–10.1)
CHLORIDE SERPL-SCNC: 105 MMOL/L (ref 94–109)
CO2 SERPL-SCNC: 29 MMOL/L (ref 20–32)
CREAT SERPL-MCNC: 1.02 MG/DL (ref 0.66–1.25)
ERYTHROCYTE [DISTWIDTH] IN BLOOD BY AUTOMATED COUNT: 12.9 % (ref 10–15)
GFR SERPL CREATININE-BSD FRML MDRD: 73 ML/MIN/1.7M2
GLUCOSE SERPL-MCNC: 76 MG/DL (ref 70–99)
HCT VFR BLD AUTO: 44.4 % (ref 40–53)
HGB BLD-MCNC: 14.5 G/DL (ref 13.3–17.7)
MCH RBC QN AUTO: 30.5 PG (ref 26.5–33)
MCHC RBC AUTO-ENTMCNC: 32.7 G/DL (ref 31.5–36.5)
MCV RBC AUTO: 93 FL (ref 78–100)
PLATELET # BLD AUTO: 186 10E9/L (ref 150–450)
POTASSIUM SERPL-SCNC: 4.4 MMOL/L (ref 3.4–5.3)
PROT SERPL-MCNC: 7 G/DL (ref 6.8–8.8)
RBC # BLD AUTO: 4.76 10E12/L (ref 4.4–5.9)
SODIUM SERPL-SCNC: 142 MMOL/L (ref 133–144)
WBC # BLD AUTO: 3.2 10E9/L (ref 4–11)

## 2018-07-31 PROCEDURE — 85027 COMPLETE CBC AUTOMATED: CPT | Performed by: INTERNAL MEDICINE

## 2018-07-31 PROCEDURE — 36415 COLL VENOUS BLD VENIPUNCTURE: CPT | Performed by: INTERNAL MEDICINE

## 2018-07-31 PROCEDURE — 80053 COMPREHEN METABOLIC PANEL: CPT | Performed by: INTERNAL MEDICINE

## 2018-08-01 NOTE — PROGRESS NOTES
The following letter pertains to your most recent diagnostic tests:    Good news! Your liver tests and white blood cell counts have returned to their baseline levels.        Sincerely,    Dr. Resendez

## 2019-02-06 ENCOUNTER — OFFICE VISIT (OUTPATIENT)
Dept: FAMILY MEDICINE | Facility: CLINIC | Age: 69
End: 2019-02-06
Payer: MEDICARE

## 2019-02-06 VITALS
OXYGEN SATURATION: 100 % | TEMPERATURE: 98.8 F | DIASTOLIC BLOOD PRESSURE: 74 MMHG | SYSTOLIC BLOOD PRESSURE: 108 MMHG | WEIGHT: 157 LBS | HEIGHT: 71 IN | HEART RATE: 75 BPM | BODY MASS INDEX: 21.98 KG/M2

## 2019-02-06 DIAGNOSIS — J06.9 UPPER RESPIRATORY TRACT INFECTION, UNSPECIFIED TYPE: Primary | ICD-10-CM

## 2019-02-06 DIAGNOSIS — R31.0 GROSS HEMATURIA: ICD-10-CM

## 2019-02-06 DIAGNOSIS — C61 PROSTATE CANCER (H): ICD-10-CM

## 2019-02-06 PROCEDURE — 99214 OFFICE O/P EST MOD 30 MIN: CPT | Performed by: INTERNAL MEDICINE

## 2019-02-06 RX ORDER — CODEINE PHOSPHATE/GUAIFENESIN 10-100MG/5
5 LIQUID (ML) ORAL
Qty: 180 ML | Refills: 0 | Status: SHIPPED | OUTPATIENT
Start: 2019-02-06 | End: 2019-07-01

## 2019-02-06 RX ORDER — CODEINE PHOSPHATE/GUAIFENESIN 10-100MG/5
5 LIQUID (ML) ORAL
Qty: 180 ML | Refills: 0 | Status: SHIPPED | OUTPATIENT
Start: 2019-02-06 | End: 2019-02-06

## 2019-02-06 ASSESSMENT — MIFFLIN-ST. JEOR: SCORE: 1514.28

## 2019-02-06 NOTE — PROGRESS NOTES
SUBJECTIVE:   Jacek Beard is a 68 year old male who presents to clinic today for the following health issues:      Cough ongoing for 6 days. Sore throat has developed with post nasal drip and congestion. Cough usually resolves on it's own in 2-3 days but hasn't subsided. Took OTC medication and didn't have any relief from symptoms.      Pleasant 68-year-old man with prostate cancer status post radiation therapy who presents with a 6-day history of an upper respiratory illness characterized by moderate cough that is productive of sputum, nasal congestion, rhinorrhea, improving sore throat.  He denies associated fevers or chills.  He denies fatigue or malaise.  He denies dyspnea on exertion.  Typically his upper respiratory illnesses improve more quickly, so he he presented for further evaluation.  His mother was in the hospital with pneumonia recently.  His mother is in her 90s.  Also, he has had intermittent nonpainful hematuria for at least 3 years.  This started to occur after radiation treatment for his prostate cancer.  He also had a kidney stone.  He had a CAT scan in October 2018 with his urologist that showed no dangerous causes in his upper urinary tract for his hematuria.  He had a cystoscopy procedure in 2016 that demonstrated radiation changes on the lining of the bladder but no other suspicious causes for his hematuria.  The persistent hematuria is bothersome to him.  Through the [facility] portal, we were able to review his most recent CAT scan results together.  It did show a tiny pulmonary nodule.  This nodule was 3 mm and the patient has never been a smoker.  There were no other new concerning findings on the CT scan and the previously identified nephrolithiasis had not been identified.  The patient recalls passing that kidney stone.  Patient does not take any anticoagulants.      Problem list and histories reviewed & adjusted, as indicated.  Additional history: as documented    Patient  Active Problem List   Diagnosis     Mitral valve disorder     HYPERLIPIDEMIA LDL GOAL <160     Prostate cancer (H)     Leukopenia     Advanced directives, counseling/discussion     Past Surgical History:   Procedure Laterality Date     BIOPSY  April 2010    RE: Prostate Cancer biopsies.     COLONOSCOPY  2007    due in 10 yrs     COLONOSCOPY N/A 10/12/2017    Procedure: COMBINED COLONOSCOPY, SINGLE OR MULTIPLE BIOPSY/POLYPECTOMY BY BIOPSY;  COLONOSCOPY;  Surgeon: Wilmer Mendez MD;  Location:  GI     COLONOSCOPY  2017     PROSTATE BIOPSY, NEEDLE, SATURATION SAMPLING         Social History     Tobacco Use     Smoking status: Never Smoker     Smokeless tobacco: Never Used   Substance Use Topics     Alcohol use: Yes     Alcohol/week: 2.4 - 3.0 oz     Comment: 2 beers  a couple times a week     Family History   Problem Relation Age of Onset     Diabetes Brother      Hypertension Brother      Diabetes Father         Type II     Prostate Cancer Father         2005     Hypertension Father         HBP Medication     Diabetes Brother         Type II     Hypertension Brother         HBP Medication     Prostate Cancer Brother         2008?     Hypertension Brother         HBP Medication     C.A.D. No family hx of      Colon Cancer No family hx of          Current Outpatient Medications   Medication Sig Dispense Refill     albuterol (VENTOLIN HFA) 108 (90 Base) MCG/ACT Inhaler Inhale 1-2 puffs into the lungs every 6 hours as needed 1 Inhaler 11     Chlorpheniramine-DM (CORICIDIN HBP COUGH/COLD) 4-30 MG TABS Take 1 tablet by mouth every 6 hours as needed (congestion, cough) 56 tablet 2     COENZYME Q-10 PO        guaiFENesin-codeine (GUAIFENESIN AC) 100-10 MG/5ML syrup Take 5 mLs by mouth nightly as needed for congestion 180 mL 0     Krill Oil CAPS Take 1 capsule by mouth daily       mupirocin (BACTROBAN) 2 % ointment        Specialty Vitamins Products (PROSTATE) TABS Take 1 tablet by mouth daily       triamcinolone  "(KENALOG) 0.1 % cream Apply 0.5 inches topically once a week. As needed   Pt avoids over use       Allergies   Allergen Reactions     Bactrim [Sulfamethoxazole W/Trimethoprim]      Penicillins      unsure of reaction       Reviewed and updated as needed this visit by clinical staff       Reviewed and updated as needed this visit by Provider         ROS:  Constitutional, HEENT, cardiovascular, pulmonary, gi and gu systems are negative, except as otherwise noted.    OBJECTIVE:     /74 (BP Location: Right arm, Patient Position: Sitting, Cuff Size: Adult Regular)   Pulse 75   Temp 98.8  F (37.1  C) (Oral)   Ht 1.803 m (5' 11\")   Wt 71.2 kg (157 lb)   SpO2 100%   BMI 21.90 kg/m    Body mass index is 21.9 kg/m .  GENERAL: healthy, alert and no distress  EYES: Eyes grossly normal to inspection, PERRL and conjunctivae and sclerae normal  HENT: ear canals and TM's normal, nose and mouth without ulcers or lesions; no sinus tenderness  NECK: no adenopathy, no asymmetry, masses, or scars and thyroid normal to palpation  RESP: lungs clear to auscultation - no rales, rhonchi or wheezes  CV: Heart with regular rate and rhythm.   ABDOMEN: soft, nontender, no hepatosplenomegaly, no masses and bowel sounds normal  MS: no gross musculoskeletal defects noted, no edema  NEURO: Normal strength and tone, mentation intact and speech normal  PSYCH: mentation appears normal, affect normal/bright        ASSESSMENT/PLAN:         1. Upper respiratory tract infection, unspecified type  This presentation is most consistent with a viral upper respiratory tract infection.  There is no physical exam evidence to suggest pneumonia or any other complication.  I suspect that his symptoms will improve significantly over the next few days.  To treat the symptoms, he can try codeine cough syrup at bedtime as well as Coricidin HBP during the day.  He will contact me if his symptoms are worse or if they fail to improve significantly over the next " week.  If that is the case, we could obtain an chest x-ray.  - guaiFENesin-codeine (GUAIFENESIN AC) 100-10 MG/5ML syrup; Take 5 mLs by mouth nightly as needed for congestion  Dispense: 180 mL; Refill: 0  - Chlorpheniramine-DM (CORICIDIN HBP COUGH/COLD) 4-30 MG TABS; Take 1 tablet by mouth every 6 hours as needed (congestion, cough)  Dispense: 56 tablet; Refill: 2    2. Prostate cancer (H)  Continue follow-up with Dr. Patel as directed.  PSA last July was low.  He plans to see me in July for a preventive exam at which time a PSA can be checked again.    3. Gross hematuria  Hematuria is probably from radiation changes on the lining of his bladder.  Since he passed his kidney stone, there are no other dangerous causes for the intermittent hematuria identified by appropriate evaluation including cystoscopy and multiple CT scans.  He is not on any anticoagulants that may be complicating the picture.  He had many questions about this which I did my best to try to answer.      Total face to face contact time was greater than 26 minutes of which more than 50% of this time was spent counseling and coordinating care regarding the above topics.      Jay Resendez MD  [facility]

## 2019-06-27 ENCOUNTER — APPOINTMENT (OUTPATIENT)
Dept: LAB | Facility: CLINIC | Age: 69
End: 2019-06-27
Payer: MEDICARE

## 2019-06-27 ENCOUNTER — DOCUMENTATION ONLY (OUTPATIENT)
Dept: LAB | Facility: CLINIC | Age: 69
End: 2019-06-27
Payer: MEDICARE

## 2019-06-27 DIAGNOSIS — E78.5 HYPERLIPIDEMIA LDL GOAL <160: Primary | ICD-10-CM

## 2019-06-27 DIAGNOSIS — C61 PROSTATE CANCER (H): ICD-10-CM

## 2019-06-27 LAB
ERYTHROCYTE [DISTWIDTH] IN BLOOD BY AUTOMATED COUNT: 12.5 % (ref 10–15)
HCT VFR BLD AUTO: 43.7 % (ref 40–53)
HGB BLD-MCNC: 14.4 G/DL (ref 13.3–17.7)
MCH RBC QN AUTO: 30.8 PG (ref 26.5–33)
MCHC RBC AUTO-ENTMCNC: 33 G/DL (ref 31.5–36.5)
MCV RBC AUTO: 93 FL (ref 78–100)
PLATELET # BLD AUTO: 213 10E9/L (ref 150–450)
RBC # BLD AUTO: 4.68 10E12/L (ref 4.4–5.9)
WBC # BLD AUTO: 3.6 10E9/L (ref 4–11)

## 2019-06-27 PROCEDURE — 36415 COLL VENOUS BLD VENIPUNCTURE: CPT | Performed by: INTERNAL MEDICINE

## 2019-06-27 PROCEDURE — 80053 COMPREHEN METABOLIC PANEL: CPT | Performed by: INTERNAL MEDICINE

## 2019-06-27 PROCEDURE — 85027 COMPLETE CBC AUTOMATED: CPT | Performed by: INTERNAL MEDICINE

## 2019-06-27 PROCEDURE — 80061 LIPID PANEL: CPT | Performed by: INTERNAL MEDICINE

## 2019-06-27 PROCEDURE — 84153 ASSAY OF PSA TOTAL: CPT | Performed by: INTERNAL MEDICINE

## 2019-06-27 NOTE — PROGRESS NOTES
Patient came in for Fasting Pre-Physical lab draw, but no orders in.  Lab linda '[place] [facility]' for testing,   Please place orders for lab, if needed.  Thank you Lab,          ROS      Physical Exam

## 2019-06-28 LAB
ALBUMIN SERPL-MCNC: 3.7 G/DL (ref 3.4–5)
ALP SERPL-CCNC: 80 U/L (ref 40–150)
ALT SERPL W P-5'-P-CCNC: 22 U/L (ref 0–70)
ANION GAP SERPL CALCULATED.3IONS-SCNC: 5 MMOL/L (ref 3–14)
AST SERPL W P-5'-P-CCNC: 19 U/L (ref 0–45)
BILIRUB SERPL-MCNC: 0.8 MG/DL (ref 0.2–1.3)
BUN SERPL-MCNC: 22 MG/DL (ref 7–30)
CALCIUM SERPL-MCNC: 8.8 MG/DL (ref 8.5–10.1)
CHLORIDE SERPL-SCNC: 109 MMOL/L (ref 94–109)
CHOLEST SERPL-MCNC: 181 MG/DL
CO2 SERPL-SCNC: 28 MMOL/L (ref 20–32)
CREAT SERPL-MCNC: 1.03 MG/DL (ref 0.66–1.25)
GFR SERPL CREATININE-BSD FRML MDRD: 75 ML/MIN/{1.73_M2}
GLUCOSE SERPL-MCNC: 79 MG/DL (ref 70–99)
HDLC SERPL-MCNC: 66 MG/DL
LDLC SERPL CALC-MCNC: 107 MG/DL
NONHDLC SERPL-MCNC: 115 MG/DL
POTASSIUM SERPL-SCNC: 4.1 MMOL/L (ref 3.4–5.3)
PROT SERPL-MCNC: 6.6 G/DL (ref 6.8–8.8)
PSA SERPL-MCNC: <0.01 UG/L (ref 0–4)
SODIUM SERPL-SCNC: 142 MMOL/L (ref 133–144)
TRIGL SERPL-MCNC: 41 MG/DL

## 2019-06-29 NOTE — RESULT ENCOUNTER NOTE
The following letter pertains to your most recent diagnostic tests:    -Your prostate specific antigen (PSA) test result returned normal.     -Liver and gallbladder tests are normal for you. (ALT,AST, Alk phos, bilirubin), kidney function is normal for you (Creatinine, GFR), Sodium is normal, Potassium is normal for you, Calcium is normal for you, Glucose (blood sugar) is normal for you.      -Your cholesterol panel looks healthy. The LDL has improved from last check and is at your goal of LDL less than 130.      -Your white blood cell count tends to run low, but it is improved from last check 11 months ago.        Bottom line:  Lab results look stable.        Follow up:  I recommend that you schedule a preventive exam as soon as convenient.        Sincerely,    Dr. Resendez

## 2019-06-30 ASSESSMENT — ACTIVITIES OF DAILY LIVING (ADL): CURRENT_FUNCTION: NO ASSISTANCE NEEDED

## 2019-07-01 ENCOUNTER — OFFICE VISIT (OUTPATIENT)
Dept: FAMILY MEDICINE | Facility: CLINIC | Age: 69
End: 2019-07-01
Payer: MEDICARE

## 2019-07-01 VITALS
DIASTOLIC BLOOD PRESSURE: 74 MMHG | TEMPERATURE: 97.4 F | HEART RATE: 97 BPM | HEIGHT: 71 IN | BODY MASS INDEX: 21.42 KG/M2 | SYSTOLIC BLOOD PRESSURE: 127 MMHG | OXYGEN SATURATION: 99 % | WEIGHT: 153 LBS

## 2019-07-01 DIAGNOSIS — Z00.00 ENCOUNTER FOR MEDICARE ANNUAL WELLNESS EXAM: Primary | ICD-10-CM

## 2019-07-01 DIAGNOSIS — E78.5 HYPERLIPIDEMIA LDL GOAL <160: ICD-10-CM

## 2019-07-01 DIAGNOSIS — C61 PROSTATE CANCER (H): ICD-10-CM

## 2019-07-01 DIAGNOSIS — D72.819 LEUKOPENIA, UNSPECIFIED TYPE: ICD-10-CM

## 2019-07-01 PROCEDURE — G0438 PPPS, INITIAL VISIT: HCPCS | Performed by: INTERNAL MEDICINE

## 2019-07-01 ASSESSMENT — MIFFLIN-ST. JEOR: SCORE: 1491.13

## 2019-07-01 ASSESSMENT — ACTIVITIES OF DAILY LIVING (ADL): CURRENT_FUNCTION: NO ASSISTANCE NEEDED

## 2019-07-01 NOTE — PATIENT INSTRUCTIONS
Patient Education   Personalized Prevention Plan  You are due for the preventive services outlined below.  Your care team is available to assist you in scheduling these services.  If you have already completed any of these items, please share that information with your care team to update in your medical record.  Health Maintenance Due   Topic Date Due     Zoster (Shingles) Vaccine (1 of 2) 02/25/2002     Discuss Advance Directive Planning  10/18/2017     FALL RISK ASSESSMENT  06/22/2019     Annual Wellness Visit  06/22/2019       Understanding [employer] MyPlate  The USDA (U.S. Department of Agriculture) has guidelines to help you make healthy food choices. These are called MyPlate. MyPlate shows the food groups that make up healthy meals using the image of a place setting. Before you eat, think about the healthiest choices for what to put onto your plate or into your cup or bowl. To learn more about building a healthy plate, visit www.choosemyplate.gov.    The food groups    Fruits. Any fruit or 100% fruit juice counts as part of the Fruit Group. Fruits may be fresh, canned, frozen, or dried, and may be whole, cut-up, or pureed. Make half your plate fruits and vegetables.    Vegetables. Any vegetable or 100% vegetable juice counts as a member of the Vegetable Group. Vegetables may be fresh, frozen, canned, or dried. They can be served raw or cooked and may be whole, cut-up, or mashed. Make half your plate fruits and vegetables.    Grains. All foods made from grains are part of the Grains Group. These include wheat, rice, oats, cornmeal, and barley such as bread, pasta, oatmeal, cereal, tortillas, and grits. Grains should be no more than a quarter of your plate. At least half of your grains should be whole grains.    Protein. This group includes meat, poultry, seafood, beans and peas, eggs, processed soy products (like tofu), nuts (including nut butters), and seeds. Make protein choices no more than a quarter of your  plate. Meat and poultry choices should be lean or low fat.    Dairy. All fluid milk products and foods made from milk that contain calcium, like yogurt and cheese, are part of the Dairy Group. (Foods that have little calcium, such as cream, butter, and cream cheese, are not part of the group.) Most dairy choices should be low-fat or fat-free.    Oils. These are fats that are liquid at room temperature. They include canola, corn, olive, soybean, and sunflower oil. Foods that are mainly oil include mayonnaise, certain salad dressings, and soft margarines. You should have only 5 to 7 teaspoons of oils a day. You probably already get this much from the food you eat.  Date Last Reviewed: 8/1/2017 2000-2018 [employer]. [address] [place] [address]. All rights reserved. This information is not intended as a substitute for professional medical care. Always follow your healthcare professional's instructions.          Signs of Hearing Loss     Hearing much better with one ear can be a sign of hearing loss.     Hearing loss is a problem shared by many people. In fact, it is one of the most common health conditions, particularly as people age. Most people over age 65 have some hearing loss, and by age 80, almost everyone does. Because hearing loss usually occurs slowly over the years, you may not realize your hearing ability has gotten worse.  Have your hearing checked  Contact your healthcare provider if you:    Have to strain to hear normal conversation    Have to watch other people s faces very carefully to follow what they re saying    Need to ask people to repeat what they ve said    Often misunderstand what people are saying    Turn the volume of the television or radio up so high that others complain    Feel that people are mumbling when they re talking to you    Find that the effort to hear leaves you feeling tired and irritated    Notice, when using the phone, that you hear better  "with one ear than the other  Date Last Reviewed: 12/1/2016 2000-2018 [employer]. [address] [place] [address], [place] [address]. All rights reserved. This information is not intended as a substitute for professional medical care. Always follow your healthcare professional's instructions.    You should get the new shingles vaccine series \"SHINGRIX\" (not Zostavax) at a pharmacy.           "

## 2019-07-01 NOTE — PROGRESS NOTES
"SUBJECTIVE:   Jacek Beard is a 69 year old male who presents for Preventive Visit.    Are you in the first 12 months of your Medicare coverage?  No    Healthy Habits:     In general, how would you rate your overall health?  Good    Frequency of exercise:  2-3 days/week    Duration of exercise:  15-30 minutes    Do you usually eat at least 4 servings of fruit and vegetables a day, include whole grains    & fiber and avoid regularly eating high fat or \"junk\" foods?  No    Taking medications regularly:  Yes    Barriers to taking medications:  None    Medication side effects:  Other    Ability to successfully perform activities of daily living:  No assistance needed    Home Safety:  No safety concerns identified    Hearing Impairment:  Difficult to understand a speaker at a public meeting or Synagogue service and difficulty understanding soft or whispered speech    In the past 6 months, have you been bothered by leaking of urine?  No    In general, how would you rate your overall mental or emotional health?  Good      PHQ-2 Total Score: 1    Additional concerns today:  No    Do you feel safe in your environment? Yes    Do you have a Health Care Directive? Yes: Patient states has Advance Directive and will bring in a copy to clinic.      Fall risk  Fallen 2 or more times in the past year?: No  Any fall with injury in the past year?: No    Cognitive Screening   1) Repeat 3 items (Leader, Season, Table)    2) Clock draw: NORMAL  3) 3 item recall: Recalls 3 objects  Results: 3 items recalled: COGNITIVE IMPAIRMENT LESS LIKELY    Mini-CogTM Copyright MAR[employer]. Licensed by the author for use in [facility]; reprinted with permission (thomas@.[facility]). All rights reserved.      Do you have sleep apnea, excessive snoring or daytime drowsiness?: no    Reviewed and updated as needed this visit by clinical staff  Tobacco  Allergies  Meds  Problems         Reviewed and updated as needed this visit by Provider      "   Social History     Tobacco Use     Smoking status: Never Smoker     Smokeless tobacco: Never Used   Substance Use Topics     Alcohol use: Yes     Alcohol/week: 2.4 - 3.0 oz     Comment: 2 beers  a couple times a week     If you drink alcohol do you typically have >3 drinks per day or >7 drinks per week? No    Alcohol Use 7/1/2019   Prescreen: >3 drinks/day or >7 drinks/week? -   Prescreen: >3 drinks/day or >7 drinks/week? No               Current providers sharing in care for this patient include:   Patient Care Team:  Jay Resendez MD as PCP - General (Internal Medicine)  Jay Resendez MD as Assigned PCP    The following health maintenance items are reviewed in Epic and correct as of today:  Health Maintenance   Topic Date Due     ZOSTER IMMUNIZATION (1 of 2) 02/25/2002     ADVANCE CARE PLANNING  10/18/2017     FALL RISK ASSESSMENT  06/22/2019     MEDICARE ANNUAL WELLNESS VISIT  06/22/2019     INFLUENZA VACCINE (1) 09/01/2019     LIPID  06/27/2024     DTAP/TDAP/TD IMMUNIZATION (3 - Td) 12/21/2025     COLONOSCOPY  10/12/2027     HEPATITIS C SCREENING  Completed     PHQ-2  Completed     AORTIC ANEURYSM SCREENING (SYSTEM ASSIGNED)  Completed     IPV IMMUNIZATION  Aged Out     MENINGITIS IMMUNIZATION  Aged Out     Patient Active Problem List   Diagnosis     Mitral valve disorder     HYPERLIPIDEMIA LDL GOAL <160     Prostate cancer (H)     Leukopenia     Advanced directives, counseling/discussion     Past Surgical History:   Procedure Laterality Date     BIOPSY  April 2010    RE: Prostate Cancer biopsies.     COLONOSCOPY  2007    due in 10 yrs     COLONOSCOPY N/A 10/12/2017    Procedure: COMBINED COLONOSCOPY, SINGLE OR MULTIPLE BIOPSY/POLYPECTOMY BY BIOPSY;  COLONOSCOPY;  Surgeon: Wilmer Mendez MD;  Location:  GI     COLONOSCOPY  2017     PROSTATE BIOPSY, NEEDLE, SATURATION SAMPLING         Social History     Tobacco Use     Smoking status: Never Smoker     Smokeless tobacco: Never Used   Substance  "Use Topics     Alcohol use: Yes     Alcohol/week: 2.4 - 3.0 oz     Comment: 2 beers  a couple times a week     Family History   Problem Relation Age of Onset     Diabetes Brother      Hypertension Brother      Diabetes Father         Type II     Prostate Cancer Father         2005     Hypertension Father         HBP Medication     Diabetes Brother         Type II     Hypertension Brother         HBP Medication     Prostate Cancer Brother         2008?     Hypertension Brother         HBP Medication     C.A.D. No family hx of      Colon Cancer No family hx of          Current Outpatient Medications   Medication Sig Dispense Refill     albuterol (VENTOLIN HFA) 108 (90 Base) MCG/ACT Inhaler Inhale 1-2 puffs into the lungs every 6 hours as needed 1 Inhaler 11     COENZYME Q-10 PO        Krill Oil CAPS Take 1 capsule by mouth daily       mupirocin (BACTROBAN) 2 % ointment        triamcinolone (KENALOG) 0.1 % cream Apply 0.5 inches topically once a week. As needed   Pt avoids over use       TURMERIC PO        Allergies   Allergen Reactions     Bactrim [Sulfamethoxazole W/Trimethoprim]      Penicillins      unsure of reaction         Review of Systems  Constitutional, HEENT, cardiovascular, pulmonary, gi and gu systems are negative, except as otherwise noted.    OBJECTIVE:   /74 (BP Location: Right arm, Cuff Size: Adult Regular)   Pulse 97   Temp 97.4  F (36.3  C) (Tympanic)   Ht 1.803 m (5' 11\")   Wt 69.4 kg (153 lb)   SpO2 99%   BMI 21.34 kg/m   Estimated body mass index is 21.34 kg/m  as calculated from the following:    Height as of this encounter: 1.803 m (5' 11\").    Weight as of this encounter: 69.4 kg (153 lb).  Physical Exam  GENERAL: healthy, alert and no distress  EYES: Eyes grossly normal to inspection, PERRL and conjunctivae and sclerae normal  HENT: ear canals and TM's normal, nose and mouth without ulcers or lesions  NECK: no adenopathy, no asymmetry, masses, or scars and thyroid normal to " palpation  RESP: lungs clear to auscultation - no rales, rhonchi or wheezes  CV: regular rate and rhythm, normal S1 S2, no S3 or S4, no murmur, click or rub, no peripheral edema and peripheral pulses strong  ABDOMEN: soft, nontender, no hepatosplenomegaly, no masses and bowel sounds normal  RECTAL: normal sphincter tone, no rectal masses, prostate tissue not palpable  MS: no gross musculoskeletal defects noted, no edema  SKIN: no suspicious lesions or rashes  NEURO: Normal strength and tone, mentation intact and speech normal  PSYCH: mentation appears normal, affect normal/bright    Diagnostic Test Results:  Results for orders placed or performed in visit on 06/27/19   Comprehensive metabolic panel   Result Value Ref Range    Sodium 142 133 - 144 mmol/L    Potassium 4.1 3.4 - 5.3 mmol/L    Chloride 109 94 - 109 mmol/L    Carbon Dioxide 28 20 - 32 mmol/L    Anion Gap 5 3 - 14 mmol/L    Glucose 79 70 - 99 mg/dL    Urea Nitrogen 22 7 - 30 mg/dL    Creatinine 1.03 0.66 - 1.25 mg/dL    GFR Estimate 75 >60 mL/min/[1.73_m2]    GFR Estimate If Black 87 >60 mL/min/[1.73_m2]    Calcium 8.8 8.5 - 10.1 mg/dL    Bilirubin Total 0.8 0.2 - 1.3 mg/dL    Albumin 3.7 3.4 - 5.0 g/dL    Protein Total 6.6 (L) 6.8 - 8.8 g/dL    Alkaline Phosphatase 80 40 - 150 U/L    ALT 22 0 - 70 U/L    AST 19 0 - 45 U/L   CBC with platelets   Result Value Ref Range    WBC 3.6 (L) 4.0 - 11.0 10e9/L    RBC Count 4.68 4.4 - 5.9 10e12/L    Hemoglobin 14.4 13.3 - 17.7 g/dL    Hematocrit 43.7 40.0 - 53.0 %    MCV 93 78 - 100 fl    MCH 30.8 26.5 - 33.0 pg    MCHC 33.0 31.5 - 36.5 g/dL    RDW 12.5 10.0 - 15.0 %    Platelet Count 213 150 - 450 10e9/L   Lipid panel reflex to direct LDL Fasting   Result Value Ref Range    Cholesterol 181 <200 mg/dL    Triglycerides 41 <150 mg/dL    HDL Cholesterol 66 >39 mg/dL    LDL Cholesterol Calculated 107 (H) <100 mg/dL    Non HDL Cholesterol 115 <130 mg/dL   PSA tumor marker   Result Value Ref Range    PSA <0.01 0 - 4 ug/L  "      ASSESSMENT / PLAN:   1. Encounter for Medicare annual wellness exam      2. Prostate cancer (H)  Stable     3. Hyperlipidemia LDL goal <160  OK control     4. Leukopenia, unspecified type  Stable       End of Life Planning:  Patient currently has an advanced directive: Yes.  Practitioner is supportive of decision.    COUNSELING:  Reviewed preventive health counseling, as reflected in patient instructions  Special attention given to:       Consider AAA screening for ages 65-75 and smoking history; done        Regular exercise       Healthy diet/nutrition       Immunizations    shingrix recommended              Hepatitis C screening; done       Consider lung cancer screening for ages 55-80 years and 30 pack-year smoking history ; never smoker        Colon cancer screening; repeat 2027; non neoplastic tissue biopsied last October        Prostate cancer screening; PSA today     Estimated body mass index is 21.34 kg/m  as calculated from the following:    Height as of this encounter: 1.803 m (5' 11\").    Weight as of this encounter: 69.4 kg (153 lb).         reports that he has never smoked. He has never used smokeless tobacco.      Appropriate preventive services were discussed with this patient, including applicable screening as appropriate for cardiovascular disease, diabetes, osteopenia/osteoporosis, and glaucoma.  As appropriate for age/gender, discussed screening for colorectal cancer, prostate cancer, breast cancer, and cervical cancer. Checklist reviewing preventive services available has been given to the patient.    Reviewed patients plan of care and provided an AVS. The Basic Care Plan (routine screening as documented in Health Maintenance) for Jacek meets the Care Plan requirement. This Care Plan has been established and reviewed with the Patient.    Counseling Resources:  ATP IV Guidelines  Pooled Cohorts Equation Calculator  Breast Cancer Risk Calculator  FRAX Risk Assessment  ICSI Preventive " Guidelines  Dietary Guidelines for Americans, 2010  USDA's MyPlate  ASA Prophylaxis  Lung CA Screening    Jay Resendez MD  [facility]    Identified Health Risks:    The patient was counseled and encouraged to consider modifying their diet and eating habits. He was provided with information on recommended healthy diet options.  The patient was provided with written information regarding signs of hearing loss.

## 2019-10-01 ENCOUNTER — HEALTH MAINTENANCE LETTER (OUTPATIENT)
Age: 69
End: 2019-10-01

## 2020-03-28 NOTE — PROGRESS NOTES
"  SUBJECTIVE:                                                    Jacek Beard is a 68 year old male who presents to clinic today for the following health issues:      Sharp \"Pin prick\" jolt in head -lasts few seconds-unsure if from abx for toe infection  Toe infection/Derm      Duration:     Description (location/character/radiation): Top of Lt big toe    Intensity:  moderate    Accompanying signs and symptoms: Swelling-improving; No pain in toe or foot; Swelling was in whole top of foot-improved; low grade fever    History (similar episodes/previous evaluation): Seen by Dermatology    Precipitating or alleviating factors: None    Therapies tried and outcome: Abx and cream       This is a pleasant 68-year-old man with a history of prostate cancer who presented to his dermatology clinic with concerns about a swollen, painful, red left big toe.  He was diagnosed with cellulitis and treated with Bactrim and told to soak his foot in warm water.  His toe redness and swelling and pain is improving.  However, while taking Bactrim, he noticed intermittent, sharp, moderate to severe pinprick-like jolts in the distribution of his right occipital nerve.  The symptoms seem to improve when he took Tylenol last night.  He was wondering if the symptoms might be a side effect from Bactrim and was referred to his primary care clinic by his dermatologist when inquiring regarding that.  Also, he rarely uses albuterol to help with cough during allergy season.  He asked for a refill of his albuterol today.    Problem list and histories reviewed & adjusted, as indicated.  Additional history: as documented    Patient Active Problem List   Diagnosis     Mitral valve disorder     HYPERLIPIDEMIA LDL GOAL <160     Prostate cancer (H)     Leukopenia     Advanced directives, counseling/discussion     Past Surgical History:   Procedure Laterality Date     COLONOSCOPY  2007    due in 10 yrs     COLONOSCOPY N/A 10/12/2017    Procedure: " bloody stool x 5 days "COMBINED COLONOSCOPY, SINGLE OR MULTIPLE BIOPSY/POLYPECTOMY BY BIOPSY;  COLONOSCOPY;  Surgeon: Wilmer Mendez MD;  Location:  GI     PROSTATE BIOPSY, NEEDLE, SATURATION SAMPLING         Social History   Substance Use Topics     Smoking status: Never Smoker     Smokeless tobacco: Never Used     Alcohol use 2.4 - 3.0 oz/week     4 - 5 Standard drinks or equivalent per week      Comment: 2 beers  a couple times a week     Family History   Problem Relation Age of Onset     DIABETES Brother      Hypertension Brother      DIABETES Father      Prostate Cancer Father      Hypertension Father      C.A.D. No family hx of      Colon Cancer No family hx of          Current Outpatient Prescriptions   Medication Sig Dispense Refill     albuterol (VENTOLIN HFA) 108 (90 Base) MCG/ACT Inhaler Inhale 1-2 puffs into the lungs every 6 hours as needed 1 Inhaler 11     COENZYME Q-10 PO        desonide (DESOWEN) 0.05 % cream Apply 1 applicator topically daily as needed (face and ears)       Krill Oil CAPS Take 1 capsule by mouth daily       mupirocin (BACTROBAN) 2 % ointment        Specialty Vitamins Products (PROSTATE) TABS Take 1 tablet by mouth daily       sulfamethoxazole-trimethoprim (BACTRIM DS/SEPTRA DS) 800-160 MG per tablet        triamcinolone (KENALOG) 0.1 % cream Apply 0.5 inches topically once a week. As needed   Pt avoids over use       [DISCONTINUED] Albuterol Sulfate (VENTOLIN HFA IN)        Allergies   Allergen Reactions     Penicillins      unsure of reaction       ROS:  Constitutional, HEENT, cardiovascular, pulmonary, gu (history of kidney stones and prostate cancer) systems are negative, except as otherwise noted.    OBJECTIVE:     /67 (BP Location: Right arm, Patient Position: Chair, Cuff Size: Adult Regular)  Pulse 94  Temp 99.1  F (37.3  C) (Tympanic)  Resp 18  Ht 5' 11\" (1.803 m)  Wt 157 lb (71.2 kg)  SpO2 98%  BMI 21.9 kg/m2  Body mass index is 21.9 kg/(m^2).  General: This is a pleasant, " comfortable, nontoxic-appearing middle-age man in no acute distress.  Foot: There is an ingrown right great toenail.  There is erythema and tenderness of the skin surrounding the ingrown toenail that is well within the pen marks outlined by his dermatology clinic.  He also has a severe left bunion.  HEENT: The scalp is atraumatic and normocephalic there is no rash overlying the painful area of his right scalp.  Symptoms cannot be reproduced with palpation of the skin over the right scalp.  Neurological: Alert and oriented to person place time, cranial nerves II 12 appear grossly intact, symmetric strength, normal gait.  Mental status: Mildly anxious affect, normal mood, well-groomed, normal speech.    Diagnostic Test Results:  none     ASSESSMENT/PLAN:       1. Ingrown nail  This patient needs his ingrown toenail removed or else he will have persistent problems with infection in the skin of his left toe.  He was referred to podiatry to discuss toenail removal with digital block.  - PODIATRY/FOOT & ANKLE SURGERY REFERRAL    2. Cellulitis of toe of left foot  His cellulitis seems to be improving with appropriate antibiotic therapy and warm water soaks    3. Occipital neuralgia of right side  His symptoms are consistent with occipital neuralgia.  Fortunately, his symptoms are now well controlled with the use of Tylenol.  If Tylenol does not continue to control symptoms, return for consideration of an occipital nerve block.  I am not certain there is any relationship between the trimethoprim sulfa and the occipital neuralgia symptoms.    4. Bronchospasm  Refilled bronchodilator  - albuterol (VENTOLIN HFA) 108 (90 Base) MCG/ACT Inhaler; Inhale 1-2 puffs into the lungs every 6 hours as needed  Dispense: 1 Inhaler; Refill: 11    5. Hyperlipidemia LDL goal <160  He is due for a physical in June, he would like to do labs before the physical  - CBC with platelets; Future  - Comprehensive metabolic panel; Future  - Lipid panel  reflex to direct LDL Fasting; Future    6. Prostate cancer (H)    - PSA tumor marker; Future    FUTURE APPOINTMENTS:       - Follow-up visit preventive exam in June, see podiatry as soon as possible, return sooner if symptom relief for occipital neuralgia is needed    Jay Resendez MD  [facility]

## 2020-06-30 ENCOUNTER — TELEPHONE (OUTPATIENT)
Dept: FAMILY MEDICINE | Facility: CLINIC | Age: 70
End: 2020-06-30

## 2020-06-30 NOTE — TELEPHONE ENCOUNTER
"Patient was advised by Derm today, to get Tetanus vaccine \"if it's been 5 years\"---for leg injury.     8 days ago, patient cut his left calf while working in yard---by a tree branch.      Was seen today by Derm due to bruised area close to left knee that concerned patient.    Per patient---no sign of infection at today's visit.     Last TDAP:  12 21 15 (4 1/2 years)  Injury x 8 days ago.     1. Please advise---does patient need updated Tetanus?    2. Patient due for annual physical---has Medicare.   Prefers face to face appointment rather than  Virtual Visit--however will take advise from Dr Resendez since clinic visit for physical not available until fall.     3. Asking to have fasting labs done prior to physical---whether it's clinic visit or Virtual Medicare wellness visit.     Please advise.     Thank you,  Hannah HECTOR RN,BSN      "

## 2020-06-30 NOTE — TELEPHONE ENCOUNTER
Placed call.  Information given to  from PCP.patient  States understood and agreed with advise.  Patient plans to get the Td booster at a local pharmacy and will call clinic to have records updated.   Patient will call clinic mid-Aug to try and schedule an in-clinic physical and separate lab appointment prior to the physical---for Sept or Oct, if possible.      Hannah Snyder RN

## 2020-06-30 NOTE — TELEPHONE ENCOUNTER
He can certainly come in for a tetanus booster Td  He is no urgently due for any labs  He can wait until it is possible to schedule a face to face preventive exam if that is his preference, labs can be drawn as appropriate at that time

## 2020-06-30 NOTE — TELEPHONE ENCOUNTER
Reason for Call:  Other  Immunization     Detailed comments:  Pt has questions regarding his tdap hx .  Pt was told get a tdap but last one wass done  12/21/2015    Phone Number Patient can be reached at: Cell number on file:    Telephone Information:   Mobile [phone]       Best Time: any    Can we leave a detailed message on this number? YES    Call taken on 6/30/2020 at 1:25 PM by Niranjan More

## 2020-11-17 ENCOUNTER — NURSE TRIAGE (OUTPATIENT)
Dept: FAMILY MEDICINE | Facility: CLINIC | Age: 70
End: 2020-11-17

## 2020-11-17 NOTE — TELEPHONE ENCOUNTER
Reason for call:  Patient reporting a symptom    Symptom or request: pain in neck and pressure his in head that feels like when you bend over and blood rushes to your head. Happens intermittently. Can be days between episodes     Duration (how long have symptoms been present): 5 months    Have you been treated for this before? No    Additional comments: pt looking for advice on whether to come in or just wait until Physical in late Dec    Phone Number patient can be reached at:  Home number on file [phone] (home)    Best Time:  any    Can we leave a detailed message on this number:  YES    Call taken on 11/17/2020 at 4:42 PM by Eric Quick

## 2020-11-18 NOTE — TELEPHONE ENCOUNTER
"Spoke with Pt:     S: Pressure/tension/tightness in neck/head     B: 2.5yrs ago had staff inf in toe-- was given an abx and had a terrible reaction-- with pressure in head. abx were stopped and it did go away     A:   Tightness/tension/pressure in head   Onset: since the first week in June     Denies symptoms today   Intermittent- comes/goes     When first noticing- was bending down to pick something up felt like \"all this blood rushed to head\" then it went away     \"hard for me to describe\"     Aggravating: Sitting for extended periods of time (I.e. now working from home), bending/leaning down    Works out 3 days/week     Denies vision changes   Denies CP   Denies SOB  Denies weakness or numbness in upper extremities    R:  OV with PCP to discuss     Aminata GLOVER RN           "

## 2020-11-18 NOTE — TELEPHONE ENCOUNTER
Additional Information    Neck pain is a chronic symptom (recurrent or ongoing AND lasting > 4 weeks)    Age > 50 and no prior history of similar neck pain    Protocols used: NECK PAIN OR [id number]      Triaged per Epic Triage Protocol, gave care advice which patient plans to follow.  See Care advice tab for more information.  Patent to call back if further questions or concerns.

## 2020-11-19 ENCOUNTER — OFFICE VISIT (OUTPATIENT)
Dept: FAMILY MEDICINE | Facility: CLINIC | Age: 70
End: 2020-11-19
Payer: MEDICARE

## 2020-11-19 VITALS
TEMPERATURE: 98.2 F | HEART RATE: 95 BPM | BODY MASS INDEX: 23.1 KG/M2 | SYSTOLIC BLOOD PRESSURE: 124 MMHG | OXYGEN SATURATION: 96 % | DIASTOLIC BLOOD PRESSURE: 78 MMHG | WEIGHT: 165 LBS | HEIGHT: 71 IN

## 2020-11-19 DIAGNOSIS — H91.90 HEARING LOSS, UNSPECIFIED HEARING LOSS TYPE, UNSPECIFIED LATERALITY: ICD-10-CM

## 2020-11-19 DIAGNOSIS — R51.9 NONINTRACTABLE EPISODIC HEADACHE, UNSPECIFIED HEADACHE TYPE: Primary | ICD-10-CM

## 2020-11-19 LAB — ERYTHROCYTE [SEDIMENTATION RATE] IN BLOOD BY WESTERGREN METHOD: 4 MM/H (ref 0–20)

## 2020-11-19 PROCEDURE — 36415 COLL VENOUS BLD VENIPUNCTURE: CPT | Performed by: INTERNAL MEDICINE

## 2020-11-19 PROCEDURE — 99214 OFFICE O/P EST MOD 30 MIN: CPT | Performed by: INTERNAL MEDICINE

## 2020-11-19 PROCEDURE — 85652 RBC SED RATE AUTOMATED: CPT | Performed by: INTERNAL MEDICINE

## 2020-11-19 ASSESSMENT — MIFFLIN-ST. JEOR: SCORE: 1540.57

## 2020-11-19 NOTE — PATIENT INSTRUCTIONS
"Please call [facility] at [phone] to schedule your brain MRI.     You should get the new shingles vaccine series \"SHINGRIX\" (not Zostavax) at a pharmacy.    "

## 2020-11-19 NOTE — LETTER
November 20, 2020      Jacek Morganernesto  [address] [place] [address]        Dear ,    We are writing to inform you of your test results.    The following letter pertains to your most recent diagnostic tests:     Good news! -Your ESR test (Sed Rate) returned normal.  This is a test of body inflammation and a normal result essentially excludes an inflammatory condition called polymyalgia rheumatica/ giant cell arteritis as a potential cause for your head symptoms.       Resulted Orders   ESR: Erythrocyte sedimentation rate   Result Value Ref Range    Sed Rate 4 0 - 20 mm/h       If you have any questions or concerns, please call the clinic at the number listed above.       Sincerely,        Jay Resendez MD

## 2020-11-19 NOTE — PROGRESS NOTES
"Subjective     Jacek Beard is a 70 year old male who presents to clinic today for the following health issues:    HPI         Neck problems for 5-6 months. Intermittent episodes of pressure that radiates into the back of the head. First episode happened in June, 2020. He bent over and felt pressure build up in the back of his head. Still happens occasionally. Not painful.     No focal weakness or numbness  No vision change  No photophobia or neck stiffness  No fevers or chills  No nausea or vomitting     Notes bilateral tinnitus moderate that makes it hard for him to hear people symptoms for months  No vertigo       Review of Systems   Pertinent +/-'s in HPI       Objective    /78 (BP Location: Left arm, Cuff Size: Adult Regular)   Pulse 95   Temp 98.2  F (36.8  C) (Tympanic)   Ht 1.803 m (5' 11\")   Wt 74.8 kg (165 lb)   SpO2 96%   BMI 23.01 kg/m    Body mass index is 23.01 kg/m .  Physical Exam   GENERAL: healthy, alert and no distress  NEURO: Alert and oriented to person, place and time. Cranial nerves 2-12 appear grossly intact.   Normal strength in all extremities.  Normal gait an Romberg.  Normal fundoscopic exam.   No pronator drift.  Cerebellar testing normal.    PSYCH: mentation appears normal, affect mildly anxious             Assessment & Plan     Nonintractable episodic headache, unspecified headache type  Check below; if negative, consider tension type headaches, consider trial of PT ; discussed with patient   - ESR: Erythrocyte sedimentation rate  - MR Brain w/o & w Contrast; Future    Hearing loss, unspecified hearing loss type, unspecified laterality  Referral for hearing test and ENT consult   - OTOLARYNGOLOGY REFERRAL            Return in about 1 month (around 12/19/2020) for Preventive Visit.   Patient instructed to return to clinic or contact us sooner if symptoms worsen or new symptoms develop.     Jay Resendez MD  [facility]    "

## 2020-11-20 NOTE — RESULT ENCOUNTER NOTE
The following letter pertains to your most recent diagnostic tests:    Good news! -Your ESR test (Sed Rate) returned normal.  This is a test of body inflammation and a normal result essentially excludes an inflammatory condition called polymyalgia rheumatica/ giant cell arteritis as a potential cause for your head symptoms.        Sincerely,    Dr. Resendez

## 2020-12-03 ENCOUNTER — HOSPITAL ENCOUNTER (OUTPATIENT)
Dept: MRI IMAGING | Facility: CLINIC | Age: 70
Discharge: HOME | End: 2020-12-03
Attending: INTERNAL MEDICINE | Admitting: INTERNAL MEDICINE
Payer: MEDICARE

## 2020-12-03 DIAGNOSIS — R51.9 NONINTRACTABLE EPISODIC HEADACHE, UNSPECIFIED HEADACHE TYPE: ICD-10-CM

## 2020-12-03 PROCEDURE — A9585 GADOBUTROL INJECTION: HCPCS | Performed by: INTERNAL MEDICINE

## 2020-12-03 PROCEDURE — 70553 MRI BRAIN STEM W/O & W/DYE: CPT

## 2020-12-03 PROCEDURE — 255N000002 HC RX 255 OP 636: Performed by: INTERNAL MEDICINE

## 2020-12-03 RX ORDER — GADOBUTROL 604.72 MG/ML
7 INJECTION INTRAVENOUS ONCE
Status: COMPLETED | OUTPATIENT
Start: 2020-12-03 | End: 2020-12-03

## 2020-12-03 RX ADMIN — GADOBUTROL 7 ML: 604.72 INJECTION INTRAVENOUS at 19:04

## 2020-12-03 NOTE — LETTER
December 4, 2020      Jacek CHANDRA [address]  [address] [place] [address]        Dear ,    We are writing to inform you of your test results.    The following letter pertains to your most recent diagnostic tests:     Good news! The brain MRI is normal.       Resulted Orders   MR Brain w/o & w Contrast    Narrative    MRI BRAIN WITHOUT AND WITH CONTRAST  12/3/2020 7:30 PM     HISTORY:  Posterior headache.    TECHNIQUE:  Multiplanar, multisequence MRI of the brain without and  with 7 mL Gadavist.     COMPARISON: None.     FINDINGS: No restricted diffusion to suggest infarct. No evidence of  acute intracranial hemorrhage. No mass effect or midline shift. Subtle  T2 hyperintensity in the white matter around the frontal horn of the  left lateral ventricle is likely of little clinical significance and  appears to surround a venous structure. No abnormal extra-axial fluid  collection. Ventricular size is within normal limits without evidence  of hydrocephalus.    There is no abnormal intracranial enhancement.     The facial structures appear normal. The major arterial T2 flow voids  at the base of the brain appear patent.       Impression    IMPRESSION:  Unremarkable brain MRI without evidence of acute infarct,  mass, hemorrhage, or herniation.     RAMAN REID MD       If you have any questions or concerns, please call the clinic at the number listed above.       Sincerely,      Jay Resendez MD

## 2020-12-04 NOTE — RESULT ENCOUNTER NOTE
The following letter pertains to your most recent diagnostic tests:    Good news! The brain MRI is normal.          Sincerely,    Dr. Resendez

## 2020-12-07 ENCOUNTER — DOCUMENTATION ONLY (OUTPATIENT)
Dept: FAMILY MEDICINE | Facility: CLINIC | Age: 70
End: 2020-12-07

## 2020-12-07 NOTE — PROGRESS NOTES
There are no orders for this patient for the upcoming lab visit. Please order labs as needed.     Reason for visit: Fasting labs w/ Upcomming Appointment.     Thank you, lab.

## 2020-12-07 NOTE — TELEPHONE ENCOUNTER
Can you call him and ask him to just have his labs checked after his visit with me on 12/19 to avoid extra trips to the clinic during covid?  Fasting is not that important

## 2020-12-07 NOTE — PROGRESS NOTES
Spoke with patient about  message and cancelled lab appointment.    [facility]

## 2020-12-26 ASSESSMENT — ACTIVITIES OF DAILY LIVING (ADL): CURRENT_FUNCTION: NO ASSISTANCE NEEDED

## 2020-12-28 ENCOUNTER — OFFICE VISIT (OUTPATIENT)
Dept: FAMILY MEDICINE | Facility: CLINIC | Age: 70
End: 2020-12-28
Payer: MEDICARE

## 2020-12-28 VITALS
HEIGHT: 71 IN | OXYGEN SATURATION: 100 % | SYSTOLIC BLOOD PRESSURE: 135 MMHG | BODY MASS INDEX: 22.31 KG/M2 | WEIGHT: 159.4 LBS | TEMPERATURE: 98.2 F | DIASTOLIC BLOOD PRESSURE: 81 MMHG | HEART RATE: 85 BPM

## 2020-12-28 DIAGNOSIS — M54.2 NECK PAIN: ICD-10-CM

## 2020-12-28 DIAGNOSIS — R00.2 PALPITATIONS: ICD-10-CM

## 2020-12-28 DIAGNOSIS — Z00.00 ROUTINE GENERAL MEDICAL EXAMINATION AT A HEALTH CARE FACILITY: Primary | ICD-10-CM

## 2020-12-28 DIAGNOSIS — R07.9 CHEST PAIN, UNSPECIFIED TYPE: ICD-10-CM

## 2020-12-28 DIAGNOSIS — C61 PROSTATE CANCER (H): ICD-10-CM

## 2020-12-28 LAB
ERYTHROCYTE [DISTWIDTH] IN BLOOD BY AUTOMATED COUNT: 12.6 % (ref 10–15)
HCT VFR BLD AUTO: 47.4 % (ref 40–53)
HGB BLD-MCNC: 15.6 G/DL (ref 13.3–17.7)
MCH RBC QN AUTO: 31 PG (ref 26.5–33)
MCHC RBC AUTO-ENTMCNC: 32.9 G/DL (ref 31.5–36.5)
MCV RBC AUTO: 94 FL (ref 78–100)
PLATELET # BLD AUTO: 185 10E9/L (ref 150–450)
RBC # BLD AUTO: 5.04 10E12/L (ref 4.4–5.9)
WBC # BLD AUTO: 3.2 10E9/L (ref 4–11)

## 2020-12-28 PROCEDURE — 84153 ASSAY OF PSA TOTAL: CPT | Performed by: INTERNAL MEDICINE

## 2020-12-28 PROCEDURE — 99214 OFFICE O/P EST MOD 30 MIN: CPT | Mod: 25 | Performed by: INTERNAL MEDICINE

## 2020-12-28 PROCEDURE — 36415 COLL VENOUS BLD VENIPUNCTURE: CPT | Performed by: INTERNAL MEDICINE

## 2020-12-28 PROCEDURE — 80061 LIPID PANEL: CPT | Performed by: INTERNAL MEDICINE

## 2020-12-28 PROCEDURE — G0402 INITIAL PREVENTIVE EXAM: HCPCS | Performed by: INTERNAL MEDICINE

## 2020-12-28 PROCEDURE — 85027 COMPLETE CBC AUTOMATED: CPT | Performed by: INTERNAL MEDICINE

## 2020-12-28 PROCEDURE — 80053 COMPREHEN METABOLIC PANEL: CPT | Performed by: INTERNAL MEDICINE

## 2020-12-28 ASSESSMENT — MIFFLIN-ST. JEOR: SCORE: 1520.54

## 2020-12-28 ASSESSMENT — ACTIVITIES OF DAILY LIVING (ADL): CURRENT_FUNCTION: NO ASSISTANCE NEEDED

## 2020-12-28 NOTE — PATIENT INSTRUCTIONS
"Call [phone] to schedule your stress echocardiogram (ultrasound of the heart) and heart monitor (ZIO/Holter) test.      You should get the new shingles vaccine series \"SHINGRIX\" (not Zostavax) at a pharmacy.    "

## 2020-12-28 NOTE — PROGRESS NOTES
"SUBJECTIVE:   Jacek Beard is a 70 year old male who presents for Preventive Visit.    1.  Still has mild intermittent posterior head / neck pains lasting minutes and resolving ; MRI negative and sed rate negative; wants to see PT  2.  Describes a great variety of intermittent symptoms in chest lasting seconds to minutes that are not provoked by exertion.  He is worried about heart disease.  He woke from sleep on bill night with palpitations last minutes and resolving.  Remote history of SVT.  'How do I know this is not Afib\"?    Patient has been advised of split billing requirements and indicates understanding: Yes   Are you in the first 12 months of your Medicare coverage?  Yes,  Visual Acuity:  Right Eye: 20/32   Left Eye: 20/25  Both Eyes: 20/25 - with glasses     Healthy Habits:     In general, how would you rate your overall health?  Good    Frequency of exercise:  2-3 days/week    Duration of exercise:  15-30 minutes    Do you usually eat at least 4 servings of fruit and vegetables a day, include whole grains    & fiber and avoid regularly eating high fat or \"junk\" foods?  No    Taking medications regularly:  Yes    Medication side effects:  Not applicable    Ability to successfully perform activities of daily living:  No assistance needed    Home Safety:  No safety concerns identified    Hearing Impairment:  Find that men's voices are easier to understand than woman's and difficulty understanding soft or whispered speech    In the past 6 months, have you been bothered by leaking of urine?  No    In general, how would you rate your overall mental or emotional health?  Good      PHQ-2 Total Score: 0    Additional concerns today:  No    Do you feel safe in your environment? Yes    Have you ever done Advance Care Planning? (For example, a Health Directive, POLST, or a discussion with a medical provider or your loved ones about your wishes): Yes, patient states has an Advance Care Planning document and " will bring a copy to the clinic.      Fall risk  Fallen 2 or more times in the past year?: No  Any fall with injury in the past year?: No    Cognitive Screening   1) Repeat 3 items (Leader, Season, Table)    2) Clock draw: NORMAL  3) 3 item recall: Recalls 3 objects  Results: 3 items recalled: COGNITIVE IMPAIRMENT LESS LIKELY    Mini-CogTM Copyright MARIBEL Sierra. Licensed by the author for use in [facility]; reprinted with permission (soob@[facility]). All rights reserved.      Do you have sleep apnea, excessive snoring or daytime drowsiness?: no    Reviewed and updated as needed this visit by clinical staff  Tobacco  Allergies  Meds              Reviewed and updated as needed this visit by Provider                Social History     Tobacco Use     Smoking status: Never Smoker     Smokeless tobacco: Never Used   Substance Use Topics     Alcohol use: Yes     Alcohol/week: 4.0 - 5.0 standard drinks     Comment: 2 beers  a couple times a week     If you drink alcohol do you typically have >3 drinks per day or >7 drinks per week? No    Alcohol Use 12/28/2020   Prescreen: >3 drinks/day or >7 drinks/week? -   Prescreen: >3 drinks/day or >7 drinks/week? No         Current providers sharing in care for this patient include:   Patient Care Team:  Jay Resendez MD as PCP - General (Internal Medicine)  Jay Resendez MD as Assigned PCP    The following health maintenance items are reviewed in Epic and correct as of today:  Health Maintenance   Topic Date Due     ZOSTER IMMUNIZATION (1 of 2) 02/25/2002     ADVANCE CARE PLANNING  10/18/2017     FALL RISK ASSESSMENT  11/19/2021     MEDICARE ANNUAL WELLNESS VISIT  12/28/2021     LIPID  06/27/2024     DTAP/TDAP/TD IMMUNIZATION (3 - Td) 12/21/2025     COLORECTAL CANCER SCREENING  10/12/2027     HEPATITIS C SCREENING  Completed     PHQ-2  Completed     INFLUENZA VACCINE  Completed     Pneumococcal Vaccine: 65+ Years  Completed     AORTIC ANEURYSM SCREENING (SYSTEM  ASSIGNED)  Completed     Pneumococcal Vaccine: Pediatrics (0 to 5 Years) and At-Risk Patients (6 to 64 Years)  Aged Out     IPV IMMUNIZATION  Aged Out     MENINGITIS IMMUNIZATION  Aged Out     HEPATITIS B IMMUNIZATION  Aged Out     Patient Active Problem List   Diagnosis     Mitral valve disorder     HYPERLIPIDEMIA LDL GOAL <160     Prostate cancer (H)     Leukopenia     Advanced directives, counseling/discussion     Past Surgical History:   Procedure Laterality Date     BIOPSY  April 2010    RE: Prostate Cancer biopsies.     COLONOSCOPY  2007    due in 10 yrs     COLONOSCOPY N/A 10/12/2017    Procedure: COMBINED COLONOSCOPY, SINGLE OR MULTIPLE BIOPSY/POLYPECTOMY BY BIOPSY;  COLONOSCOPY;  Surgeon: Wilmer Mendez MD;  Location:  GI     COLONOSCOPY  2017     PROSTATE BIOPSY, NEEDLE, SATURATION SAMPLING         Social History     Tobacco Use     Smoking status: Never Smoker     Smokeless tobacco: Never Used   Substance Use Topics     Alcohol use: Yes     Alcohol/week: 4.0 - 5.0 standard drinks     Comment: 2 beers  a couple times a week     Family History   Problem Relation Age of Onset     Diabetes Brother      Hypertension Brother      Diabetes Father         Type II     Prostate Cancer Father         2005     Hypertension Father         HBP Medication     Diabetes Brother         Type II     Hypertension Brother         HBP Medication     Prostate Cancer Brother         2008?     Hypertension Brother         HBP Medication     Cerebrovascular Disease Mother         Age 95 -     C.A.D. No family hx of      Colon Cancer No family hx of          Current Outpatient Medications   Medication Sig Dispense Refill     albuterol (VENTOLIN HFA) 108 (90 Base) MCG/ACT Inhaler Inhale 1-2 puffs into the lungs every 6 hours as needed 1 Inhaler 11     Cyanocobalamin (B-12 PO)        Krill Oil CAPS Take 1 capsule by mouth daily       triamcinolone (KENALOG) 0.1 % cream Apply 0.5 inches topically once a week. As needed   Pt  "avoids over use       COENZYME Q-10 PO        TURMERIC PO        Allergies   Allergen Reactions     Bactrim [Sulfamethoxazole W/Trimethoprim]      Penicillins      unsure of reaction         Review of Systems  A 10 organ systems ROS is negative other than any pertinent positives or negatives previously stated.     OBJECTIVE:   /81 (BP Location: Left arm, Patient Position: Sitting, Cuff Size: Adult Regular)   Pulse 85   Temp 98.2  F (36.8  C) (Temporal)   Ht 1.812 m (5' 11.34\")   Wt 72.3 kg (159 lb 6.4 oz)   SpO2 100%   BMI 22.02 kg/m   Estimated body mass index is 22.02 kg/m  as calculated from the following:    Height as of this encounter: 1.812 m (5' 11.34\").    Weight as of this encounter: 72.3 kg (159 lb 6.4 oz).  Physical Exam  GENERAL: healthy, alert and no distress  EYES: Eyes grossly normal to inspection, PERRL and conjunctivae and sclerae normal  HENT: ear canals and TM's normal, nose and mouth without ulcers or lesions  NECK: no adenopathy, no asymmetry, masses, or scars and thyroid normal to palpation  RESP: lungs clear to auscultation - no rales, rhonchi or wheezes  CV: regular rate and rhythm, normal S1 S2, no S3 or S4, no murmur, click or rub, no peripheral edema and peripheral pulses strong  ABDOMEN: soft, nontender, no hepatosplenomegaly, no masses and bowel sounds normal  MS: no gross musculoskeletal defects noted, no edema  SKIN: no suspicious lesions or rashes  NEURO: Normal strength and tone, mentation intact and speech normal  PSYCH: mentation appears normal, he has a fair degree of anxiety and need for reassurance about his health state  Labs pending     ASSESSMENT / PLAN:   1. Routine general medical examination at a health care facility    - Comprehensive metabolic panel  - CBC with platelets  - Lipid panel reflex to direct LDL Fasting    2. Prostate cancer (H)    - PSA tumor marker    3. Chest pain, unspecified type  Not really typical symptoms of angina  He has a great deal of " "concern about the symptoms  Discussed stress test for risk stratifications pros and cons  He would like to proceed with provocative stress testing  - Echocardiogram Exercise Stress; Future    4. Palpitations  Check Holter  - Zio Patch Holter Adult Pediatric Greater than 48 hrs; Future    5. Neck pain  Referral to PT  - [facility]; Future    Patient has been advised of split billing requirements and indicates understanding: Yes  COUNSELING:  Reviewed preventive health counseling, as reflected in patient instructions  Special attention given to:       Consider AAA screening for ages 65-75 and smoking history; done       Regular exercise       Healthy diet/nutrition       Immunizations    Recommended shingrix              Hepatitis C screening; done       HIV screening for high risk patient; done       Consider lung cancer screening for ages 55-80 years and 30 pack-year smoking history ; never smoker        Colon cancer screening; normal colonoscopy in 2017       Prostate cancer screening; PSA     Estimated body mass index is 22.02 kg/m  as calculated from the following:    Height as of this encounter: 1.812 m (5' 11.34\").    Weight as of this encounter: 72.3 kg (159 lb 6.4 oz).        He reports that he has never smoked. He has never used smokeless tobacco.      Appropriate preventive services were discussed with this patient, including applicable screening as appropriate for cardiovascular disease, diabetes, osteopenia/osteoporosis, and glaucoma.  As appropriate for age/gender, discussed screening for colorectal cancer, prostate cancer, breast cancer, and cervical cancer. Checklist reviewing preventive services available has been given to the patient.    Reviewed patients plan of care and provided an AVS. The Basic Care Plan (routine screening as documented in Health Maintenance) for Jacek meets the Care Plan requirement. This Care Plan has been established and reviewed with the " Patient.    Counseling Resources:  ATP IV Guidelines  Pooled Cohorts Equation Calculator  Breast Cancer Risk Calculator  Breast Cancer: Medication to Reduce Risk  FRAX Risk Assessment  ICSI Preventive Guidelines  Dietary Guidelines for Americans, 2010  USDA's MyPlate  ASA Prophylaxis  Lung CA Screening    Jay Resendez MD  [facility]    Identified Health Risks:

## 2020-12-28 NOTE — LETTER
December 29, 2020      Jacek CHANDRA Chirag  [address] [place] [address]        Dear ,    We are writing to inform you of your test results.    -Your cholesterol has increased slightly since last visit.     -Liver and gallbladder tests are normal for you. (ALT,AST, Alk phos, bilirubin), kidney function is normal for you (Creatinine, GFR), Sodium is normal, Potassium is normal for you, Calcium is normal for you, Glucose (blood sugar) is normal for you.         -Your prostate specific antigen (PSA) test result returned normal.     -Your complete blood counts including your hemoglobin returned stable for you.           Bottom line:   The cholesterol has increased since last check.  Based on your cholesterol levels and other risk factors, your calculated statistical risk for having a heart attack over the next 10 years is elevated.  I estimate a 14.7% chance of heart attack over the next 10 years.  Because we believe that statin medications such as atorvastatin (Lipitor) have protective effects in the blood vessels that extend beyond their ability to lower cholesterol, if you took atorvastatin (Lipitor) regularly, you could bring that 10-year statistical risk for heart attack down significantly.  Generally, we recommend statin cholesterol-lowering medications such as atorvastatin (Lipitor) to individuals with 10 year statistical risk for heart attack greater than 10%, but certainly for folks with statistical 10 year risk greater than 20%.  As such, you are a candidate for starting that drug for prevention purposes.  You should be informed that a very small minority of people who take atorvastatin   can develop muscle pain and weakness as a side effect from that drug.  If you experience those side effects, then stop the drug and contact me.  If you start the medication, you should have a follow up fasting cholesterol panel after you have been taking the medication for 2 months.  You can schedule a  lab appointment for that purpose.  Please contact me if you would like to get that medication started so we can send an prescritpion and arrange for appropriate follow up.         I will contact you with the results of your stress test and heart monitor.           Follow up:  Contact us if you want to get started on atorvastatin (Lipitor)       Sincerely,     Dr. Resendez     Resulted Orders   Comprehensive metabolic panel   Result Value Ref Range    Sodium 140 133 - 144 mmol/L    Potassium 4.7 3.4 - 5.3 mmol/L    Chloride 107 94 - 109 mmol/L    Carbon Dioxide 30 20 - 32 mmol/L    Anion Gap 3 3 - 14 mmol/L    Glucose 77 70 - 99 mg/dL    Urea Nitrogen 19 7 - 30 mg/dL    Creatinine 0.99 0.66 - 1.25 mg/dL    GFR Estimate 78 >60 mL/min/[1.73_m2]      Comment:      Non  GFR Calc  Starting 12/18/2018, serum creatinine based estimated GFR (eGFR) will be   calculated using the Chronic Kidney Disease Epidemiology Collaboration   (CKD-EPI) equation.      GFR Estimate If Black 90 >60 mL/min/[1.73_m2]      Comment:       GFR Calc  Starting 12/18/2018, serum creatinine based estimated GFR (eGFR) will be   calculated using the Chronic Kidney Disease Epidemiology Collaboration   (CKD-EPI) equation.      Calcium 9.0 8.5 - 10.1 mg/dL    Bilirubin Total 1.0 0.2 - 1.3 mg/dL    Albumin 3.9 3.4 - 5.0 g/dL    Protein Total 7.5 6.8 - 8.8 g/dL    Alkaline Phosphatase 94 40 - 150 U/L    ALT 22 0 - 70 U/L    AST 23 0 - 45 U/L   CBC with platelets   Result Value Ref Range    WBC 3.2 (L) 4.0 - 11.0 10e9/L    RBC Count 5.04 4.4 - 5.9 10e12/L    Hemoglobin 15.6 13.3 - 17.7 g/dL    Hematocrit 47.4 40.0 - 53.0 %    MCV 94 78 - 100 fl    MCH 31.0 26.5 - 33.0 pg    MCHC 32.9 31.5 - 36.5 g/dL    RDW 12.6 10.0 - 15.0 %    Platelet Count 185 150 - 450 10e9/L   Lipid panel reflex to direct LDL Fasting   Result Value Ref Range    Cholesterol 217 (H) <200 mg/dL      Comment:      Desirable:       <200 mg/dl    Triglycerides  50 <150 mg/dL    HDL Cholesterol 76 >39 mg/dL    LDL Cholesterol Calculated 131 (H) <100 mg/dL      Comment:      Above desirable:  100-129 mg/dl  Borderline High:  130-159 mg/dL  High:             160-189 mg/dL  Very high:       >189 mg/dl      Non HDL Cholesterol 141 (H) <130 mg/dL      Comment:      Above Desirable:  130-159 mg/dl  Borderline high:  160-189 mg/dl  High:             190-219 mg/dl  Very high:       >219 mg/dl     PSA tumor marker   Result Value Ref Range    PSA <0.01 0 - 4 ug/L      Comment:      Assay Method:  Chemiluminescence using Siemens Vista analyzer       If you have any questions or concerns, please call the clinic at the number listed above.       Sincerely,      Jay Resendez MD

## 2020-12-29 LAB
ALBUMIN SERPL-MCNC: 3.9 G/DL (ref 3.4–5)
ALP SERPL-CCNC: 94 U/L (ref 40–150)
ALT SERPL W P-5'-P-CCNC: 22 U/L (ref 0–70)
ANION GAP SERPL CALCULATED.3IONS-SCNC: 3 MMOL/L (ref 3–14)
AST SERPL W P-5'-P-CCNC: 23 U/L (ref 0–45)
BILIRUB SERPL-MCNC: 1 MG/DL (ref 0.2–1.3)
BUN SERPL-MCNC: 19 MG/DL (ref 7–30)
CALCIUM SERPL-MCNC: 9 MG/DL (ref 8.5–10.1)
CHLORIDE SERPL-SCNC: 107 MMOL/L (ref 94–109)
CHOLEST SERPL-MCNC: 217 MG/DL
CO2 SERPL-SCNC: 30 MMOL/L (ref 20–32)
CREAT SERPL-MCNC: 0.99 MG/DL (ref 0.66–1.25)
GFR SERPL CREATININE-BSD FRML MDRD: 78 ML/MIN/{1.73_M2}
GLUCOSE SERPL-MCNC: 77 MG/DL (ref 70–99)
HDLC SERPL-MCNC: 76 MG/DL
LDLC SERPL CALC-MCNC: 131 MG/DL
NONHDLC SERPL-MCNC: 141 MG/DL
POTASSIUM SERPL-SCNC: 4.7 MMOL/L (ref 3.4–5.3)
PROT SERPL-MCNC: 7.5 G/DL (ref 6.8–8.8)
PSA SERPL-MCNC: <0.01 UG/L (ref 0–4)
SODIUM SERPL-SCNC: 140 MMOL/L (ref 133–144)
TRIGL SERPL-MCNC: 50 MG/DL

## 2020-12-29 NOTE — RESULT ENCOUNTER NOTE
The following letter pertains to your most recent diagnostic tests:    -Your cholesterol has increased slightly since last visit.    -Liver and gallbladder tests are normal for you. (ALT,AST, Alk phos, bilirubin), kidney function is normal for you (Creatinine, GFR), Sodium is normal, Potassium is normal for you, Calcium is normal for you, Glucose (blood sugar) is normal for you.        -Your prostate specific antigen (PSA) test result returned normal.     -Your complete blood counts including your hemoglobin returned stable for you.          Bottom line:   The cholesterol has increased since last check.  Based on your cholesterol levels and other risk factors, your calculated statistical risk for having a heart attack over the next 10 years is elevated.  I estimate a 14.7% chance of heart attack over the next 10 years.  Because we believe that statin medications such as atorvastatin (Lipitor) have protective effects in the blood vessels that extend beyond their ability to lower cholesterol, if you took atorvastatin (Lipitor) regularly, you could bring that 10-year statistical risk for heart attack down significantly.  Generally, we recommend statin cholesterol-lowering medications such as atorvastatin (Lipitor) to individuals with 10 year statistical risk for heart attack greater than 10%, but certainly for folks with statistical 10 year risk greater than 20%.  As such, you are a candidate for starting that drug for prevention purposes.  You should be informed that a very small minority of people who take atorvastatin  can develop muscle pain and weakness as a side effect from that drug.  If you experience those side effects, then stop the drug and contact me.  If you start the medication, you should have a follow up fasting cholesterol panel after you have been taking the medication for 2 months.  You can schedule a lab appointment for that purpose.  Please contact me if you would like to get that medication  started so we can send an prescritpion and arrange for appropriate follow up.        I will contact you with the results of your stress test and heart monitor.          Follow up:  Contact us if you want to get started on atorvastatin (Lipitor)       Sincerely,    Dr. Resendez    The 10-year ASCVD risk score (Beba CLEVELAND Jr., et al., 2013) is: 14.7%    Values used to calculate the score:      Age: 70 years      Sex: Male      Is Non- : No      Diabetic: No      Tobacco smoker: No      Systolic Blood Pressure: 135 mmHg      Is BP treated: No      HDL Cholesterol: 76 mg/dL      Total Cholesterol: 217 mg/dL

## 2021-01-11 ENCOUNTER — HOSPITAL ENCOUNTER (OUTPATIENT)
Dept: CARDIOLOGY | Facility: CLINIC | Age: 71
Discharge: HOME | End: 2021-01-11
Attending: INTERNAL MEDICINE | Admitting: INTERNAL MEDICINE
Payer: MEDICARE

## 2021-01-11 DIAGNOSIS — R07.9 CHEST PAIN, UNSPECIFIED TYPE: Primary | ICD-10-CM

## 2021-01-11 PROCEDURE — 255N000002 HC RX 255 OP 636: Performed by: INTERNAL MEDICINE

## 2021-01-11 PROCEDURE — 999N000208 ECHO STRESS ECHOCARDIOGRAM

## 2021-01-11 PROCEDURE — 93350 STRESS TTE ONLY: CPT | Mod: 26 | Performed by: INTERNAL MEDICINE

## 2021-01-11 PROCEDURE — 93018 CV STRESS TEST I&R ONLY: CPT | Performed by: INTERNAL MEDICINE

## 2021-01-11 PROCEDURE — 93321 DOPPLER ECHO F-UP/LMTD STD: CPT | Mod: 26 | Performed by: INTERNAL MEDICINE

## 2021-01-11 PROCEDURE — 93016 CV STRESS TEST SUPVJ ONLY: CPT | Performed by: INTERNAL MEDICINE

## 2021-01-11 PROCEDURE — 93325 DOPPLER ECHO COLOR FLOW MAPG: CPT | Mod: 26 | Performed by: INTERNAL MEDICINE

## 2021-01-11 RX ADMIN — HUMAN ALBUMIN MICROSPHERES AND PERFLUTREN 9 ML: 10; .22 INJECTION, SOLUTION INTRAVENOUS at 15:45

## 2021-01-11 NOTE — RESULT ENCOUNTER NOTE
The following letter pertains to your most recent diagnostic tests:    Good news! Your stress echocardiogram is normal.  You exercise capacity is good.  The chest pain is not likely heart related.        Sincerely,    Dr. Resendez

## 2021-01-12 ENCOUNTER — HOSPITAL ENCOUNTER (OUTPATIENT)
Dept: CARDIOLOGY | Facility: CLINIC | Age: 71
Discharge: HOME | End: 2021-01-12
Attending: INTERNAL MEDICINE | Admitting: INTERNAL MEDICINE
Payer: MEDICARE

## 2021-01-12 DIAGNOSIS — R00.2 PALPITATIONS: ICD-10-CM

## 2021-01-12 PROCEDURE — 93248 EXT ECG>7D<15D REV&INTERPJ: CPT | Performed by: INTERNAL MEDICINE

## 2021-01-12 PROCEDURE — 93246 EXT ECG>7D<15D RECORDING: CPT

## 2021-02-06 NOTE — RESULT ENCOUNTER NOTE
The following letter pertains to your most recent diagnostic tests:      Good news! The heart monitor did not show any dangerous heart arrhythmias to explain your palpitation symptoms.  The heart monitor test did show some premature ventricular contractions and premature atrial contractions which are not dangerous abnormal heartbeats that sometimes result in palpitation symptoms.  If the symptoms are intolerable, the is not dangerous abnormal heartbeats can be treated, but this treatment would be optional.  If you feel your symptoms are severe enough to warrant treatment, please schedule a follow-up appointment with me to discuss the pros and cons of treatment.        Sincerely,    Dr. Resendez

## 2021-03-09 ENCOUNTER — IMMUNIZATION (OUTPATIENT)
Dept: NURSING | Facility: CLINIC | Age: 71
End: 2021-03-09
Payer: MEDICARE

## 2021-03-09 PROCEDURE — 91301 PR COVID VAC MODERNA 100 MCG/0.5 ML IM: CPT

## 2021-03-09 PROCEDURE — 0011A PR COVID VAC MODERNA 100 MCG/0.5 ML IM: CPT

## 2021-03-16 ENCOUNTER — THERAPY VISIT (OUTPATIENT)
Dept: PHYSICAL THERAPY | Facility: CLINIC | Age: 71
End: 2021-03-16
Payer: MEDICARE

## 2021-03-16 DIAGNOSIS — M54.2 NECK PAIN: ICD-10-CM

## 2021-03-16 DIAGNOSIS — M54.2 CERVICAL PAIN: ICD-10-CM

## 2021-03-16 PROCEDURE — 97110 THERAPEUTIC EXERCISES: CPT | Mod: GP | Performed by: PHYSICAL THERAPIST

## 2021-03-16 PROCEDURE — 97112 NEUROMUSCULAR REEDUCATION: CPT | Mod: GP | Performed by: PHYSICAL THERAPIST

## 2021-03-16 PROCEDURE — 97161 PT EVAL LOW COMPLEX 20 MIN: CPT | Mod: GP | Performed by: PHYSICAL THERAPIST

## 2021-03-16 NOTE — PROGRESS NOTES
[facility] Initial Evaluation -- Cervical    Evaluation Date: March 16, 2021  Jacek Beard is a 71 year old male with a neck condition.   Referral: PCP  Work mechanical stresses: sitting, computer use   Employment status:   Leisure mechanical stresses: golf, exercising  Functional disability score (NDI):  2%  VAS score (0-10): 1/10  Patient goals/expectations:  To get the pain to go away    HISTORY:    Present symptoms:  Posterior head tension.  Pain quality (sharp/shooting/stabbing/aching/burning/cramping):   Aching, sharp.  Paresthesia (yes/no):  no    Present since (onset date): 06/01/2020     Symptoms (improving/unchanging/worsening):  unchanging    Symptoms commenced as a result of: bent over to pick something up  Condition occurred in the following environment:  home    Symptoms at onset (neck/arm/forearm/headache): posterior head/neck pain  Constant symptoms (neck/arm/forearm/headache): none  Intermittent symptoms (neck/arm/forearm/headache): posterior head/neck pain, L>R scapular pain, L axilla and chest pain    Symptoms are made worse with the following: bending, prolonged sitting, computer use, sneezing, turning to the L   Symptoms are made better with the following: avoiding aggravating positions    Disturbed sleep (yes/no): yes  Number of pillows: 1  Sleeping postures (prone/sup/side R/L): sides    Previous episodes (0/1-5/6-10/11+): none Year of first episode: na    Previous history: none  Previous treatments: na    Specific Questions: (as reported by the patient)  Dizziness/Tinnitus/Nausea/Swallowing (pos/neg): tinnitus--not related to current head/neck pain  Gait/Upper Limbs (normal/abnormal): normal  Medications (nil/NSAIDS/anlag/steroids/anticoag/other):  None  Medical allergies:  none  General health (excellent/good/fair/poor):  good  Pertinent medical history:  Cancer (prostate)--11 years ago--fine since  Imaging (None/Xray/MRI/Other):  Brain  scan--normal  Recent or major surgery (yes/no): no  Night pain (yes/no): no  Accidents (yes/no): no  Unexplained weight loss (yes/no): no  Barriers at home: no  Other red flags: no    EXAMINATION    Posture:   Sitting (good/fair/poor): fair  Standing (good/fair/poor): good/fair     Protruded head (yes/no): yes    Wry Neck (right/left/nil):  nil  Relevant (yes/no):  na     Correction of posture(better/worse/no effect): NE--did not have pain to begin with  Other observations:  no    Neurological:    Motor Deficit:  normal   Reflexes:  Not assessed  Sensory Deficit:  normal   Dural signs:  Not assessed    Movement Loss:   Carlos Mod Min Nil Pain   Protrusion    x NE   Flexion    x NE   Retraction   x  stretch   Extension   x  NE   Lateral flexion R  x   stiff   Lateral flexion L  x   stiff   Rotation R  x x  stiff   Rotation L   x  stiff     Test Movements:   During: produces, abolishes, increases, decreases, no effect, centralizing, peripheralizing  After: better, worse, no better, no worse, no effect, centralized, peripheralized    Pretest symptoms sitting: no pain   Symptoms During Symptoms After ROM increased ROM decreased No Effect   PRO        Rep PRO        RET W/self-OP--stretch No Worse         Rep RET W/self-OP--stretch ?better--less stiffness B rotation   x   RET EXT        Rep RET EXT          Pretest symptoms lying:     Symptoms During Symptoms After ROM increased ROM decreased No Effect   RET        Rep RET        RET EXT        Rep RET EXT          If required, pretest symptoms sitting:      Symptoms During Symptoms After ROM increased ROM decreased No Effect   LF-R        Rep LF-R        LF-L        Rep LF-L        ROT-R        Rep ROT-R        ROT-L        Rep ROT-L        FLEX        Rep FLEX            Static Tests:   Protrusion:    Flexion:    Retraction:    Extension (sitting/prone/supine):      Other Tests:     Provisional Classification:  Derangement - Asymmetrical, unilateral, symptoms above  elbow    Principle of Management:  Education:  Posture--use of lumbar roll in sitting, importance of posture; POC, treatment rationale, expected response, possible etiologies of sxs    Equipment provided:  Purchased standard lumbar roll  Mechanical therapy (Y/N):  y   Extension principle:  Seated cervical retraction with self-OP x10 reps, every 2 hours   Lateral principle:    Flexion principle:     Other:      ASSESSMENT/PLAN:    Patient is a 71 year old male with cervical complaints.  Assessing for derangement as symptoms were not present with evaluation today.  He has cervical ROM limitations and noted stiffness with certain motions and should see improvement with working on posture and mechanics.  He will start with posture correction and repeated cervical retraction to assess effect.      Patient has the following significant findings with corresponding treatment plan.                Diagnosis 1:  Neck pain, L>R scapula, axilla, chest  Pain -  self management, education, directional preference exercise and home program  Decreased ROM/flexibility - manual therapy, therapeutic exercise and home program  Decreased function - therapeutic activities and home program  Impaired posture - neuro re-education and home program      Cumulative Therapy Evaluation is: Low complexity.    Previous and current functional limitations:  (See Goal Flow Sheet for this information)    Short term and Long term goals: (See Goal Flow Sheet for this information)     Communication ability:  Patient appears to be able to clearly communicate and understand verbal and written communication and follow directions correctly.  Treatment Explanation - The following has been discussed with the patient:   RX ordered/plan of care  Anticipated outcomes  Possible risks and side effects  This patient would benefit from PT intervention to resume normal activities.   Rehab potential is good.    Frequency:  1 X week, once daily  Duration:  for 6  weeks  Discharge Plan:  Achieve all LTG.  Independent in home treatment program.  Reach maximal therapeutic benefit.    Please refer to the daily flowsheet for treatment today, total treatment time and time spent performing 1:1 timed codes.    Answers for HPI/ROS submitted by the patient on 3/13/2021   History Reported by Patient  Reason for Visit:: Neck & Head pain discomfort  How problem occurred:: Unknown. Symptoms started 6/04/2020.  Number scale: 1/10  General health as reported by patient: good  Please check all that apply to your current or past medical history: cancer  Occupation::   What are your primary job tasks: prolonged sitting

## 2021-03-16 NOTE — LETTER
DEPARTMENT OF HEALTH AND HUMAN SERVICES  [facility] MEDICA[facility]    PLAN/UPDATED PLAN OF PROGRESS FOR OUTPATIENT REHABILITATION          PATIENTS NAME:  Jacek Beard     : 1950    PROVIDER NUMBER:    [id number]    [id number]    PROVIDER NAME: [facility]DA    MEDICAL RECORD NUMBER: [id number]     START OF CARE DATE:  SOC Date: 21   TYPE:  PT    PRIMARY/TREATMENT DIAGNOSIS: (Pertinent Medical Diagnosis)     Neck pain  Cervical pain    VISITS FROM START OF CARE:  Rxs Used: 1     [facility] Initial Evaluation -- Cervical    Evaluation Date: 2021  Jacek Beard is a 71 year old male with a neck condition.   Referral: PCP  Work mechanical stresses: sitting, computer use   Employment status:   Leisure mechanical stresses: golf, exercising  Functional disability score (NDI):  2%  VAS score (0-10): 1/10  Patient goals/expectations:  To get the pain to go away  HISTORY:  Present symptoms:  Posterior head tension.  Pain quality (sharp/shooting/stabbing/aching/burning/cramping):   Aching, sharp.  Paresthesia (yes/no):  no  Present since (onset date): 2020     Symptoms (improving/unchanging/worsening):  unchanging  Symptoms commenced as a result of: bent over to pick something up  Condition occurred in the following environment:  home  Symptoms at onset (neck/arm/forearm/headache): posterior head/neck pain  Constant symptoms (neck/arm/forearm/headache): none  Intermittent symptoms (neck/arm/forearm/headache): posterior head/neck pain, L>R scapular pain, L axilla and chest pain        PATIENTS NAME:  Jacek Beard     : 1950      Symptoms are made worse with the following: bending, prolonged sitting, computer use, sneezing, turning to the L   Symptoms are made better with the following: avoiding aggravating positions  Disturbed sleep (yes/no): yes  Number of pillows:  1  Sleeping postures (prone/sup/side R/L): sides  Previous episodes (0/1-5/6-10/11+): none Year of first episode: na  Previous history: none  Previous treatments: na  Specific Questions: (as reported by the patient)  Dizziness/Tinnitus/Nausea/Swallowing (pos/neg): tinnitus--not related to current head/neck pain  Gait/Upper Limbs (normal/abnormal): normal  Medications (nil/NSAIDS/anlag/steroids/anticoag/other):  None  Medical allergies:  none  General health (excellent/good/fair/poor):  good  Pertinent medical history:  Cancer (prostate)--11 years ago--fine since  Imaging (None/Xray/MRI/Other):  Brain scan--normal  Recent or major surgery (yes/no): no  Night pain (yes/no): no  Accidents (yes/no): no  Unexplained weight loss (yes/no): no  Barriers at home: no  Other red flags: no  EXAMINATION  Posture:   Sitting (good/fair/poor): fair  Standing (good/fair/poor): good/fair   Protruded head (yes/no): yes    Wry Neck (right/left/nil):  nil  Relevant (yes/no):  na   Correction of posture(better/worse/no effect): NE--did not have pain to begin with  Other observations:  no  Neurological:  Motor Deficit:  normal   Reflexes:  Not assessed  Sensory Deficit:  normal   Dural signs:  Not assessed    Movement Loss:   Carlos Mod Min Nil Pain   Protrusion    x NE   Flexion    x NE   Retraction   x  stretch   Extension   x  NE   Lateral flexion R  x   stiff   Lateral flexion L  x   stiff   Rotation R  x x  stiff   Rotation L   x  stiff           PATIENTS NAME:  Jacek Beard     : 1950          Test Movements:   During: produces, abolishes, increases, decreases, no effect, centralizing, peripheralizing  After: better, worse, no better, no worse, no effect, centralized, peripheralized    Pretest symptoms sitting: no pain   Symptoms During Symptoms After ROM increased ROM decreased No Effect   PRO        Rep PRO        RET W/self-OP--stretch No Worse         Rep RET W/self-OP--stretch ?better--less stiffness B rotation   x    RET EXT        Rep RET EXT          Pretest symptoms lying:     Symptoms During Symptoms After ROM increased ROM decreased No Effect   RET        Rep RET        RET EXT        Rep RET EXT          If required, pretest symptoms sitting:      Symptoms During Symptoms After ROM increased ROM decreased No Effect   LF-R        Rep LF-R        LF-L        Rep LF-L        ROT-R        Rep ROT-R        ROT-L        Rep ROT-L        FLEX        Rep FLEX                      PATIENTS NAME:  Jacek Beard     : 1950        Static Tests:   Protrusion:    Flexion:    Retraction:    Extension (sitting/prone/supine):      Other Tests:     Provisional Classification:  Derangement - Asymmetrical, unilateral, symptoms above elbow    Principle of Management:  Education:  Posture--use of lumbar roll in sitting, importance of posture; POC, treatment rationale, expected response, possible etiologies of sxs    Equipment provided:  Purchased standard lumbar roll  Mechanical therapy (Y/N):  y   Extension principle:  Seated cervical retraction with self-OP x10 reps, every 2 hours   Lateral principle:    Flexion principle:     Other:      ASSESSMENT/PLAN:    Patient is a 71 year old male with cervical complaints.  Assessing for derangement as symptoms were not present with evaluation today.  He has cervical ROM limitations and noted stiffness with certain motions and should see improvement with working on posture and mechanics.  He will start with posture correction and repeated cervical retraction to assess effect.      Patient has the following significant findings with corresponding treatment plan.                Diagnosis 1:  Neck pain, L>R scapula, axilla, chest  Pain -  self management, education, directional preference exercise and home program  Decreased ROM/flexibility - manual therapy, therapeutic exercise and home program  Decreased function - therapeutic activities and home program  Impaired posture - neuro  re-education and home program  Cumulative Therapy Evaluation is: Low complexity.  Previous and current functional limitations:  (See Goal Flow Sheet for this information)    Short term and Long term goals: (See Goal Flow Sheet for this information)   Communication ability:  Patient appears to be able to clearly communicate and understand verbal and written communication and follow directions correctly.  Treatment Explanation - The following has been discussed with the patient:   RX ordered/plan of care  Anticipated outcomes  Possible risks and side effects  This patient would benefit from PT intervention to resume normal activities.   Rehab potential is good.              PATIENTS NAME:  Jacek Beard     : 1950        Frequency:  1 X week, once daily  Duration:  for 6 weeks  Discharge Plan:  Achieve all LTG.  Independent in home treatment program.  Reach maximal therapeutic benefit.    Please refer to the daily flowsheet for treatment today, total treatment time and time spent performing 1:1 timed codes.    Answers for HPI/ROS submitted by the patient on 3/13/2021   History Reported by Patient  Reason for Visit:: Neck & Head pain discomfort  How problem occurred:: Unknown. Symptoms started 2020.  Number scale: 1/10  General health as reported by patient: good  Please check all that apply to your current or past medical history: cancer  Occupation::   What are your primary job tasks: prolonged sitting        Caregiver Signature/Credentials _____________________________ Date ________       Treating Provider: Pricilla Mata, PT   I have reviewed and certified the need for these services and plan of treatment while under my care.        PHYSICIAN'S SIGNATURE:   _________________________________________  Date___________   Jay Resendez MD     Certification period:  Beginning of Cert date period: 21 to  End of Cert period date: 21     Functional Level Progress Report:  "Please see attached \"Goal Flow sheet for Functional level.\"    ____X____ Continue Services or       ________ DC Services                Service dates: From  SOC Date: 03/16/21 date to present                         "

## 2021-03-22 ENCOUNTER — THERAPY VISIT (OUTPATIENT)
Dept: PHYSICAL THERAPY | Facility: CLINIC | Age: 71
End: 2021-03-22
Payer: MEDICARE

## 2021-03-22 DIAGNOSIS — M54.2 CERVICAL PAIN: ICD-10-CM

## 2021-03-22 PROCEDURE — 97530 THERAPEUTIC ACTIVITIES: CPT | Mod: GP | Performed by: PHYSICAL THERAPIST

## 2021-03-22 PROCEDURE — 97112 NEUROMUSCULAR REEDUCATION: CPT | Mod: GP | Performed by: PHYSICAL THERAPIST

## 2021-03-22 PROCEDURE — 97110 THERAPEUTIC EXERCISES: CPT | Mod: GP | Performed by: PHYSICAL THERAPIST

## 2021-03-25 ENCOUNTER — ANCILLARY PROCEDURE (OUTPATIENT)
Dept: GENERAL RADIOLOGY | Facility: CLINIC | Age: 71
End: 2021-03-25
Attending: INTERNAL MEDICINE
Payer: MEDICARE

## 2021-03-25 ENCOUNTER — OFFICE VISIT (OUTPATIENT)
Dept: FAMILY MEDICINE | Facility: CLINIC | Age: 71
End: 2021-03-25
Payer: MEDICARE

## 2021-03-25 VITALS
HEART RATE: 75 BPM | TEMPERATURE: 98.2 F | SYSTOLIC BLOOD PRESSURE: 141 MMHG | BODY MASS INDEX: 22.68 KG/M2 | WEIGHT: 164.2 LBS | DIASTOLIC BLOOD PRESSURE: 89 MMHG | OXYGEN SATURATION: 99 %

## 2021-03-25 DIAGNOSIS — M54.2 CERVICAL PAIN: ICD-10-CM

## 2021-03-25 DIAGNOSIS — H61.23 BILATERAL IMPACTED CERUMEN: Primary | ICD-10-CM

## 2021-03-25 DIAGNOSIS — M54.2 NECK PAIN: ICD-10-CM

## 2021-03-25 PROCEDURE — 72040 X-RAY EXAM NECK SPINE 2-3 VW: CPT | Performed by: RADIOLOGY

## 2021-03-25 PROCEDURE — 99213 OFFICE O/P EST LOW 20 MIN: CPT | Mod: 25 | Performed by: INTERNAL MEDICINE

## 2021-03-25 PROCEDURE — 69210 REMOVE IMPACTED EAR WAX UNI: CPT | Performed by: INTERNAL MEDICINE

## 2021-03-25 NOTE — LETTER
March 26, 2021      Jacek Beard  [address] [place] [address]        Dear ,    We are writing to inform you of your test results.    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  However, there are no dangerous fractures or bone lesions.  I suspect that the disc degeneration and degenerative arthritis are risk factors for the neck pain that you experience.  At this point, I would get the physical therapy a few more weeks to see if it starts to improve your symptoms.  If symptoms worsen in the interim or if physical therapy fails to address her symptoms adequately within that time frame,  please contact me and we can discuss additional evaluation if needed.     Resulted Orders   XR Cervical Spine 2/3 Views    Narrative    XR CERVICAL SPINE TWO-THREE VIEWS 3/25/2021 5:47 PM     COMPARISON: None    HISTORY: Neck pain. Cervical pain.      Impression    IMPRESSION: There is normal alignment of the cervical vertebrae.  Vertebral body heights of the cervical spine are normal.  Craniocervical alignment is normal. There are no fractures of the  cervical spine.  Loss of intervertebral disc space height and  degenerative endplate spurring at all levels of the cervical spine  with exception of the C2-C3 and C7-T1 levels. Minimal facet  arthropathy throughout the cervical spine.    JOE CARRILLO MD       If you have any questions or concerns, please call the clinic at the number listed above.       Sincerely,      Jay Resendez MD

## 2021-03-25 NOTE — PROGRESS NOTES
Assessment & Plan     Bilateral impacted cerumen  2 large cerumen impactions were removed from the bilateral external auditory canals by Dr. Resendez using a curette.  The procedure was tolerated well.  - REMOVE IMPACTED CERUMEN    Neck pain  Check x-ray to exclude metastatic disease or fracture.  If the x-ray is negative, I might suggest that he give physical therapy a little more time.  If there is no success with additional physical therapy sessions, consider a cervical spine MRI or even an MRA of the head and neck  - XR Cervical Spine 2/3 Views; Future            30 minutes spent on the date of the encounter doing chart review, history and exam, documentation and further activities as noted above           Return in about 9 months (around 12/29/2021) for Preventive Visit.    Jay Resendez MD  [facility] PETRA Gabriel is a 71 year old who presents for the following health issues     HPI     Chief Complaint   Patient presents with     Ear Problem     pt report ear plugged - requesting ear wash        This is a nice man with a history of prostate cancer who presents with a plugging sensation in his bilateral ears.  He has a long history of ceruminosis.  He asks for his earwax to be removed.  Also, he has questions about his neck pain.  He has had 2 physical therapy sessions, but he does not feel like he is making much progress.  He describes pain localized to his mid line upper neck that sometimes radiates into his posterior scalp when he coughs or sneezes or leans over.  He had a negative MRI of the brain.  He wonders if he should have an x-ray of the neck?  He denies any radiation of pain down his arms or any new arm numbness or weakness.    Review of Systems         Objective    BP (!) 141/89 (BP Location: Left arm, Patient Position: Sitting, Cuff Size: Adult Large)   Pulse 75   Temp 98.2  F (36.8  C) (Temporal)   Wt 74.5 kg (164 lb 3.2 oz)   SpO2 99%   BMI 22.68 kg/m    Body  mass index is 22.68 kg/m .  Physical Exam   General: This is a well-appearing man in no acute distress who appears quite comfortable.  HEENT: After removal of 2 large cerumen impactions from the bilateral external auditory canals, the bilateral tympanic membranes were normal.    X-ray of the cervical spine is pending

## 2021-03-26 NOTE — RESULT ENCOUNTER NOTE
The following letter pertains to your most recent diagnostic tests:    As we suspected, your cervical spine x-ray shows some degeneration of the intervertebral discs and degenerative arthritis of the joints in the cervical spine.  However, there are no dangerous fractures or bone lesions.  I suspect that the disc degeneration and degenerative arthritis are risk factors for the neck pain that you experience.  At this point, I would get the physical therapy a few more weeks to see if it starts to improve your symptoms.  If symptoms worsen in the interim or if physical therapy fails to address her symptoms adequately within that time frame,  please contact me and we can discuss additional evaluation if needed.        Sincerely,    Dr. Resendez

## 2021-03-29 ENCOUNTER — THERAPY VISIT (OUTPATIENT)
Dept: PHYSICAL THERAPY | Facility: CLINIC | Age: 71
End: 2021-03-29
Payer: MEDICARE

## 2021-03-29 DIAGNOSIS — M54.2 CERVICAL PAIN: ICD-10-CM

## 2021-03-29 PROCEDURE — 97110 THERAPEUTIC EXERCISES: CPT | Mod: GP | Performed by: PHYSICAL THERAPIST

## 2021-03-29 PROCEDURE — 97140 MANUAL THERAPY 1/> REGIONS: CPT | Mod: GP | Performed by: PHYSICAL THERAPIST

## 2021-03-29 PROCEDURE — 97112 NEUROMUSCULAR REEDUCATION: CPT | Mod: GP | Performed by: PHYSICAL THERAPIST

## 2021-04-06 ENCOUNTER — IMMUNIZATION (OUTPATIENT)
Dept: NURSING | Facility: CLINIC | Age: 71
End: 2021-04-06
Attending: INTERNAL MEDICINE
Payer: MEDICARE

## 2021-04-06 PROCEDURE — 0012A PR COVID VAC MODERNA 100 MCG/0.5 ML IM: CPT

## 2021-04-06 PROCEDURE — 91301 PR COVID VAC MODERNA 100 MCG/0.5 ML IM: CPT

## 2021-04-12 ENCOUNTER — THERAPY VISIT (OUTPATIENT)
Dept: PHYSICAL THERAPY | Facility: CLINIC | Age: 71
End: 2021-04-12
Payer: MEDICARE

## 2021-04-12 DIAGNOSIS — M54.2 CERVICAL PAIN: ICD-10-CM

## 2021-04-12 PROCEDURE — 97112 NEUROMUSCULAR REEDUCATION: CPT | Mod: GP | Performed by: PHYSICAL THERAPIST

## 2021-04-12 PROCEDURE — 97110 THERAPEUTIC EXERCISES: CPT | Mod: GP | Performed by: PHYSICAL THERAPIST

## 2021-04-12 PROCEDURE — 97530 THERAPEUTIC ACTIVITIES: CPT | Mod: GP | Performed by: PHYSICAL THERAPIST

## 2021-04-26 ENCOUNTER — THERAPY VISIT (OUTPATIENT)
Dept: PHYSICAL THERAPY | Facility: CLINIC | Age: 71
End: 2021-04-26
Payer: MEDICARE

## 2021-04-26 DIAGNOSIS — M54.2 CERVICAL PAIN: ICD-10-CM

## 2021-04-26 PROCEDURE — 97112 NEUROMUSCULAR REEDUCATION: CPT | Mod: GP | Performed by: PHYSICAL THERAPIST

## 2021-04-26 PROCEDURE — 97110 THERAPEUTIC EXERCISES: CPT | Mod: GP | Performed by: PHYSICAL THERAPIST

## 2021-09-04 ENCOUNTER — HEALTH MAINTENANCE LETTER (OUTPATIENT)
Age: 71
End: 2021-09-04

## 2022-01-31 ENCOUNTER — THERAPY VISIT (OUTPATIENT)
Dept: PHYSICAL THERAPY | Facility: CLINIC | Age: 72
End: 2022-01-31
Payer: MEDICARE

## 2022-01-31 DIAGNOSIS — G89.29 CHRONIC RIGHT SHOULDER PAIN: ICD-10-CM

## 2022-01-31 DIAGNOSIS — M25.511 CHRONIC RIGHT SHOULDER PAIN: ICD-10-CM

## 2022-01-31 PROCEDURE — 97161 PT EVAL LOW COMPLEX 20 MIN: CPT | Mod: GP | Performed by: PHYSICAL THERAPIST

## 2022-01-31 PROCEDURE — 97110 THERAPEUTIC EXERCISES: CPT | Mod: GP | Performed by: PHYSICAL THERAPIST

## 2022-01-31 PROCEDURE — 97112 NEUROMUSCULAR REEDUCATION: CPT | Mod: GP | Performed by: PHYSICAL THERAPIST

## 2022-01-31 NOTE — PROGRESS NOTES
[facility] Initial Evaluation -- Upper Extremity    Evaluation Date: January 31, 2022  Jacek Beard is a 71 year old male with a R shoulder condition.   Referral: SELF  Work mechanical stresses: sitting, computer use  Employment status:    Leisure mechanical stresses: [employer]  Functional disability score (SPADI):   VAS score (0-10): 2/10  Handedness (R/L):  R  Patient goals/expectations:  To not have the pain, be able to golf.      HISTORY    Present symptoms: R posterior shoulder pain.    Pain quality (sharp/shooting/stabbing/aching/burning/cramping):  tightness    Present since (onset date):  11/15/2021    Symptoms (improving/unchanging/worsening):  unchanging.    Symptoms commenced as a result of: exercising--doing rotator cuff strengthening with bands--describes ER combined with abduction along with abduction at 90 degrees combined with ER--reports he did these workouts a few times and one day woke with stiffness/tightness in R shoulder  Condition occurred in the following environment: at the gym    Symptoms at onset: R posterior shoulder tightness  Paresthesia (yes/no):  no  Spinal history: yes, chronic neck issues   Cough/Sneeze (pos/neg):  neg    Constant symptoms: none  Intermittent symptoms: R posterior shoulder pain    Symptoms are worse with the following: waking up in the morning feels tight in posterior shoulder, unable to lift overhead, reaching overhead, reaching out to the side and back  Symptoms are better with the following: avoiding aggravating motions    Continued use makes the pain (better/worse/no effect): NE    Disturbed night (yes/no):  no    Pain at rest (yes/no): no  Site (neck/shoulder/elbow/wrist/hand): na    Other questions (swelling/catching/clicking/locking/subluxing):  tightness    Previous episodes: history of B shoulder issues for 20 years--has had frozen shoulders B in the past  Previous treatments: PT in past for B frozen  "shoulders--helpful    Specific Questions:  General health (excellent/good/fair/poor):  good  Pertinent medical history includes: Cancer--prostate 12 years ago--has been fine since  Medications (nil/NSAIDS/analg/steroids/anticoag/other):  None  Medical allergies:  penicillin  Imaging (None/Xray/MRI/Other): none  Recent or major surgery (yes/no): no  Night pain (yes/no): no  Accidents (yes/no): no  Unexplained weight loss (yes/no): no  Barriers at home: no  Other red flags: no    Sites for physical examination (neck/shoulder/elbow/wrist/hand): R shoulder, cervical/thoracic    EXAMINATION    Posture:  Sitting (good/fair/poor): poor  Correction of posture (better/worse/no effect/NA): NE  Standing (good/fair/poor): fair  Other observations:  Forward head and rounded shoulders, increased thoracic kyphosis    Neurological (NA/motor/sensory/reflexes/dural): na    Baselines (pain or functional activity):     Extremities (Shoulder/Elbow/Wrist/Hand): R shoulder    Movement Loss Carlos Mod Min Nil Pain   Flexion   x  ERP   Extension   x  ERP   Abduction   x  ERP   Internal Rotation    x NE   External Rotation   x  ERP   Supination        Pronation        Radial Deviation        Ulnar Deviation           Passive Movement (+/- overpressure)/(PDM/ERP):  R shoulder PROM:  Flexion min loss, pain overpressure, abduction nil loss, pain with overpressure, ER min loss, pain with overpressure  Resisted Test Response (pain): R shoulder strength:  Flexion 5/5, NE; abduction 5/5, NE; IR 5/5, NE; ER 5/5, mild pain  Other Tests: none    Spine:  Movement Loss: Cervical AROM:  B rotation min loss, NE; extension min loss, \"stiff\"; B SB mod loss, tightness both ways; Thoracic ROM:  Flexion nil loss, NE; extension mod to max loss, \"stiff\"  Effect of repeated movements: repeated t-ext in sitting--stretch during, NW after, improved R shoulder flexion ROM  Effect of static positioning: na  Spine testing (not relevant/relevant/secondary problem): " relevant    Baseline Symptoms:   Repeated Tests Symptom Response Mechanical Response   Active/Passive movement, resisted test, functional test During - Produce, Abolish, Increase, Decrease, NE After -   Better, Worse, NB, NW, NE Effect -   ? or ? ROM, strength or key functional test No Effect                               Effect of static positioning                  Provisional Classification (Extremity/Spine): spine and extremity--decreased thoracic mobility and posture contributing to altered shoulder mechanics and pain      Principle of Management:  Education:  POC, treatment rationale, expected response, shoulder mechanics, thoracic position and how it relates to shoulder function  Equipment provided:  none  Exercise and dosage:  Repeated cervical retraction with self-OP and repeated t-extension in sitting x10 reps, 4x/day; shoulder extension and horizontal abduction strengthening    ASSESSMENT/PLAN:    Patient is a 71 year old male with right side shoulder complaints.  He should make good progress with treatment focusing on thoracic extension exercises in addition to posterior shoulder strengthening to improve mechanics and decrease pain.      Patient has the following significant findings with corresponding treatment plan.                Diagnosis 1:  R shoulder pain  Pain -  self management, education, directional preference exercise and home program  Decreased ROM/flexibility - manual therapy, therapeutic exercise and home program  Decreased function - therapeutic activities and home program  Impaired posture - neuro re-education and home program    Cumulative Therapy Evaluation is: Low complexity.    Previous and current functional limitations:  (See Goal Flow Sheet for this information)    Short term and Long term goals: (See Goal Flow Sheet for this information)     Communication ability:  Patient appears to be able to clearly communicate and understand verbal and written communication and follow  directions correctly.  Treatment Explanation - The following has been discussed with the patient:   RX ordered/plan of care  Anticipated outcomes  Possible risks and side effects  This patient would benefit from PT intervention to resume normal activities.   Rehab potential is good.    Frequency:  1 X week, once daily  Duration:  for 6 weeks  Discharge Plan:  Achieve all LTG.  Independent in home treatment program.  Reach maximal therapeutic benefit.    Please refer to the daily flowsheet for treatment today, total treatment time and time spent performing 1:1 timed codes.

## 2022-02-01 NOTE — PROGRESS NOTES
[facility]    OUTPATIENT Physical Therapy ORTHOPEDIC EVALUATION  PLAN OF TREATMENT FOR OUTPATIENT REHABILITATION  (COMPLETE FOR INITIAL CLAIMS ONLY)  Patient's Last Name, First Name, M.I.  YOB: 1950  Jacek Beard    Provider s Name:  [facility]   Medical Record No.  [id number]   Start of Care Date:  01/31/22   Onset Date:   01/15/21   Type:     _X__PT   ___OT Medical Diagnosis:    Encounter Diagnosis   Name Primary?     Chronic right shoulder pain         Treatment Diagnosis:  R shoulder pain/tightness        Goals:     01/31/22 0500   Body Part   Goals listed below are for R shoulder   Goal #1   Goal #1 reaching   Previous Functional Level No restrictions   Current Functional Level Can reach ;overhead   Performance level R shoulder pain/stiffness up to 4/10   STG Target Performance Reach ;overhead   Performance level R shoulder pain/stiffness 1/10   Rationale for independent personal hygiene;for dressing;for bathing;for meal preparation   Due date 02/21/22   LTG Target Performance Reach;overhead   Performance Level no R shoulder pain/stiffness   Rationale for independent personal hygiene;for dressing;for bathing;for meal preparation   Due date 03/14/22       Therapy Frequency:  1x/week  Predicted Duration of Therapy Intervention:  6 weeks    Pricilla Mata PT                 I CERTIFY THE NEED FOR THESE SERVICES FURNISHED UNDER        THIS PLAN OF TREATMENT AND WHILE UNDER MY CARE     (Physician attestation of this document indicates review and certification of the therapy plan).                       Certification Date From:  01/31/22   Certification Date To:  03/14/22    Referring Provider:  Self; Jay Resendez MD    Initial Assessment        See Epic Evaluation SOC Date: 01/31/22

## 2022-02-10 ENCOUNTER — THERAPY VISIT (OUTPATIENT)
Dept: PHYSICAL THERAPY | Facility: CLINIC | Age: 72
End: 2022-02-10
Payer: MEDICARE

## 2022-02-10 DIAGNOSIS — M25.511 CHRONIC RIGHT SHOULDER PAIN: ICD-10-CM

## 2022-02-10 DIAGNOSIS — G89.29 CHRONIC RIGHT SHOULDER PAIN: ICD-10-CM

## 2022-02-10 PROCEDURE — 97110 THERAPEUTIC EXERCISES: CPT | Mod: GP | Performed by: PHYSICAL THERAPIST

## 2022-02-10 PROCEDURE — 97530 THERAPEUTIC ACTIVITIES: CPT | Mod: GP | Performed by: PHYSICAL THERAPIST

## 2022-02-10 PROCEDURE — 97112 NEUROMUSCULAR REEDUCATION: CPT | Mod: GP | Performed by: PHYSICAL THERAPIST

## 2022-02-19 ENCOUNTER — HEALTH MAINTENANCE LETTER (OUTPATIENT)
Age: 72
End: 2022-02-19

## 2022-02-24 ENCOUNTER — THERAPY VISIT (OUTPATIENT)
Dept: PHYSICAL THERAPY | Facility: CLINIC | Age: 72
End: 2022-02-24
Payer: MEDICARE

## 2022-02-24 DIAGNOSIS — M25.511 CHRONIC RIGHT SHOULDER PAIN: ICD-10-CM

## 2022-02-24 DIAGNOSIS — G89.29 CHRONIC RIGHT SHOULDER PAIN: ICD-10-CM

## 2022-02-24 PROCEDURE — 97530 THERAPEUTIC ACTIVITIES: CPT | Mod: GP | Performed by: PHYSICAL THERAPIST

## 2022-02-24 PROCEDURE — 97110 THERAPEUTIC EXERCISES: CPT | Mod: GP | Performed by: PHYSICAL THERAPIST

## 2022-02-24 PROCEDURE — 97112 NEUROMUSCULAR REEDUCATION: CPT | Mod: GP | Performed by: PHYSICAL THERAPIST

## 2022-07-14 ENCOUNTER — OFFICE VISIT (OUTPATIENT)
Dept: FAMILY MEDICINE | Facility: CLINIC | Age: 72
End: 2022-07-14
Payer: MEDICARE

## 2022-07-14 VITALS
BODY MASS INDEX: 22.12 KG/M2 | HEIGHT: 71 IN | TEMPERATURE: 97.1 F | OXYGEN SATURATION: 100 % | DIASTOLIC BLOOD PRESSURE: 74 MMHG | RESPIRATION RATE: 16 BRPM | WEIGHT: 158 LBS | HEART RATE: 78 BPM | SYSTOLIC BLOOD PRESSURE: 126 MMHG

## 2022-07-14 DIAGNOSIS — C61 PROSTATE CANCER (H): ICD-10-CM

## 2022-07-14 DIAGNOSIS — Z00.00 ROUTINE GENERAL MEDICAL EXAMINATION AT A HEALTH CARE FACILITY: Primary | ICD-10-CM

## 2022-07-14 LAB
ERYTHROCYTE [DISTWIDTH] IN BLOOD BY AUTOMATED COUNT: 12.3 % (ref 10–15)
HCT VFR BLD AUTO: 48.9 % (ref 40–53)
HGB BLD-MCNC: 15.6 G/DL (ref 13.3–17.7)
MCH RBC QN AUTO: 30.5 PG (ref 26.5–33)
MCHC RBC AUTO-ENTMCNC: 31.9 G/DL (ref 31.5–36.5)
MCV RBC AUTO: 96 FL (ref 78–100)
PLATELET # BLD AUTO: 209 10E3/UL (ref 150–450)
RBC # BLD AUTO: 5.12 10E6/UL (ref 4.4–5.9)
WBC # BLD AUTO: 3.3 10E3/UL (ref 4–11)

## 2022-07-14 PROCEDURE — 85027 COMPLETE CBC AUTOMATED: CPT | Performed by: INTERNAL MEDICINE

## 2022-07-14 PROCEDURE — 84153 ASSAY OF PSA TOTAL: CPT | Performed by: INTERNAL MEDICINE

## 2022-07-14 PROCEDURE — 80061 LIPID PANEL: CPT | Performed by: INTERNAL MEDICINE

## 2022-07-14 PROCEDURE — 80053 COMPREHEN METABOLIC PANEL: CPT | Performed by: INTERNAL MEDICINE

## 2022-07-14 PROCEDURE — G0439 PPPS, SUBSEQ VISIT: HCPCS | Performed by: INTERNAL MEDICINE

## 2022-07-14 PROCEDURE — 36415 COLL VENOUS BLD VENIPUNCTURE: CPT | Performed by: INTERNAL MEDICINE

## 2022-07-14 RX ORDER — MULTIVIT-MIN/IRON/FOLIC ACID/K 18-600-40
CAPSULE ORAL
COMMUNITY
Start: 2021-04-01

## 2022-07-14 ASSESSMENT — ENCOUNTER SYMPTOMS
WEAKNESS: 0
COUGH: 0
PALPITATIONS: 0
HEMATURIA: 0
FEVER: 0
HEARTBURN: 0
CONSTIPATION: 0
SORE THROAT: 0
NERVOUS/ANXIOUS: 0
DIZZINESS: 0
FREQUENCY: 0
SHORTNESS OF BREATH: 0
EYE PAIN: 0
ABDOMINAL PAIN: 0
HEADACHES: 0
ARTHRALGIAS: 0
PARESTHESIAS: 0
MYALGIAS: 0
DYSURIA: 0
NAUSEA: 0
HEMATOCHEZIA: 0
JOINT SWELLING: 0
CHILLS: 0
DIARRHEA: 0

## 2022-07-14 ASSESSMENT — PAIN SCALES - GENERAL: PAINLEVEL: MODERATE PAIN (4)

## 2022-07-14 ASSESSMENT — ACTIVITIES OF DAILY LIVING (ADL): CURRENT_FUNCTION: NO ASSISTANCE NEEDED

## 2022-07-14 NOTE — PROGRESS NOTES
"SUBJECTIVE:   Jacek Beard is a 72 year old male who presents for Preventive Visit.      Patient has been advised of split billing requirements and indicates understanding: Yes  Are you in the first 12 months of your Medicare coverage?  No    Healthy Habits:     In general, how would you rate your overall health?  Good    Frequency of exercise:  4-5 days/week    Duration of exercise:  15-30 minutes    Do you usually eat at least 4 servings of fruit and vegetables a day, include whole grains    & fiber and avoid regularly eating high fat or \"junk\" foods?  No    Taking medications regularly:  Yes    Medication side effects:  None    Ability to successfully perform activities of daily living:  No assistance needed    Home Safety:  No safety concerns identified    Hearing Impairment:  Find that men's voices are easier to understand than woman's and difficulty understanding soft or whispered speech    In the past 6 months, have you been bothered by leaking of urine?  No    In general, how would you rate your overall mental or emotional health?  Excellent      PHQ-2 Total Score: 0    Additional concerns today:  Yes    Do you feel safe in your environment? Yes    Have you ever done Advance Care Planning? (For example, a Health Directive, POLST, or a discussion with a medical provider or your loved ones about your wishes): Yes, patient states has an Advance Care Planning document and will bring a copy to the clinic.       Fall risk  Fallen 2 or more times in the past year?: No  Any fall with injury in the past year?: No    Cognitive Screening   1) Repeat 3 items (Leader, Season, Table)    2) Clock draw: NORMAL  3) 3 item recall: Recalls 3 objects  Results: 3 items recalled: COGNITIVE IMPAIRMENT LESS LIKELY    Mini-CogTM Copyright MARIBEL Sierra. Licensed by the author for use in [place] [employer]; reprinted with permission (thomas@.[facility]). All rights reserved.      Do you have sleep apnea, excessive snoring or daytime " drowsiness?: no    Reviewed and updated as needed this visit by clinical staff   Tobacco  Allergies  Meds   Med Hx  Surg Hx  Fam Hx            Reviewed and updated as needed this visit by Provider   Tobacco     Med Hx  Surg Hx  Fam Hx           Social History     Tobacco Use     Smoking status: Never Smoker     Smokeless tobacco: Never Used   Substance Use Topics     Alcohol use: Yes     Alcohol/week: 4.0 - 5.0 standard drinks     Comment: 2 beers  a couple times a week     If you drink alcohol do you typically have >3 drinks per day or >7 drinks per week? No    Alcohol Use 7/14/2022   Prescreen: >3 drinks/day or >7 drinks/week? No   Prescreen: >3 drinks/day or >7 drinks/week? -         Current providers sharing in care for this patient include:   Patient Care Team:  Jay Resednez MD as PCP - General (Internal Medicine)  Jay Resendez MD as Assigned PCP    The following health maintenance items are reviewed in Epic and correct as of today:  Health Maintenance Due   Topic Date Due     COVID-19 Vaccine (4 - Booster for Moderna series) 03/11/2022     ZOSTER IMMUNIZATION (2 of 2) 05/21/2022     Patient Active Problem List   Diagnosis     Mitral valve disorder     HYPERLIPIDEMIA LDL GOAL <160     Prostate cancer (H)     Leukopenia     Advanced directives, counseling/discussion     Cervical pain     Chronic right shoulder pain     Past Surgical History:   Procedure Laterality Date     BIOPSY  April 2010    RE: Prostate Cancer biopsies.     COLONOSCOPY  2007    due in 10 yrs     COLONOSCOPY N/A 10/12/2017    Procedure: COMBINED COLONOSCOPY, SINGLE OR MULTIPLE BIOPSY/POLYPECTOMY BY BIOPSY;  COLONOSCOPY;  Surgeon: Wilmer Mendez MD;  Location:  GI     COLONOSCOPY  2017     PROSTATE BIOPSY, NEEDLE, SATURATION SAMPLING         Social History     Tobacco Use     Smoking status: Never Smoker     Smokeless tobacco: Never Used   Substance Use Topics     Alcohol use: Yes     Alcohol/week: 4.0 - 5.0  standard drinks     Comment: 2 beers  a couple times a week     Family History   Problem Relation Age of Onset     Diabetes Brother      Hypertension Brother      Diabetes Father         Type II     Prostate Cancer Father         2005     Hypertension Father         HBP Medication     Diabetes Brother         Type II     Hypertension Brother         HBP Medication     Prostate Cancer Brother         2008?     Hypertension Brother         HBP Medication     Cerebrovascular Disease Mother         Age 95 -     C.A.D. No family hx of      Colon Cancer No family hx of          Current Outpatient Medications   Medication Sig Dispense Refill     Cholecalciferol (VITAMIN D) 50 MCG (2000 UT) CAPS        Cyanocobalamin (B-12 PO)        Krill Oil CAPS Take 1 capsule by mouth daily       triamcinolone (KENALOG) 0.1 % cream Apply 0.5 inches topically once a week. As needed   Pt avoids over use       albuterol (VENTOLIN HFA) 108 (90 Base) MCG/ACT Inhaler Inhale 1-2 puffs into the lungs every 6 hours as needed 1 Inhaler 11     Allergies   Allergen Reactions     Bactrim [Sulfamethoxazole W/Trimethoprim]      Penicillins      unsure of reaction       Review of Systems   Constitutional: Negative for chills and fever.   HENT: Negative for congestion, ear pain, hearing loss and sore throat.    Eyes: Negative for pain and visual disturbance.   Respiratory: Negative for cough and shortness of breath.    Cardiovascular: Positive for chest pain. Negative for palpitations and peripheral edema.   Gastrointestinal: Negative for abdominal pain, constipation, diarrhea, heartburn, hematochezia and nausea.   Genitourinary: Negative for dysuria, frequency, genital sores, hematuria, impotence, penile discharge and urgency.   Musculoskeletal: Negative for arthralgias, joint swelling and myalgias.   Skin: Negative for rash.   Neurological: Negative for dizziness, weakness, headaches and paresthesias.   Psychiatric/Behavioral: Negative for mood  "changes. The patient is not nervous/anxious.    He fell on his chest chasing his neighbor kids playing several days ago ; mild residual pain that does not worsen with exertion      OBJECTIVE:   /74 (BP Location: Right arm, Patient Position: Sitting, Cuff Size: Adult Regular)   Pulse 78   Temp 97.1  F (36.2  C) (Temporal)   Resp 16   Ht 1.805 m (5' 11.06\")   Wt 71.7 kg (158 lb)   SpO2 100%   BMI 22.00 kg/m   Estimated body mass index is 22 kg/m  as calculated from the following:    Height as of this encounter: 1.805 m (5' 11.06\").    Weight as of this encounter: 71.7 kg (158 lb).  Physical Exam  GENERAL: healthy, alert and no distress  EYES: Eyes grossly normal to inspection, PERRL and conjunctivae and sclerae normal  HENT: ear canals and TM's normal, nose and mouth without ulcers or lesions  NECK: no adenopathy, no asymmetry, masses, or scars and thyroid normal to palpation  RESP: lungs clear to auscultation - no rales, rhonchi or wheezes  CV: regular rate and rhythm, normal S1 S2, no S3 or S4, no murmur, click or rub, no peripheral edema and peripheral pulses strong  ABDOMEN: soft, nontender, no hepatosplenomegaly, no masses and bowel sounds normal  MS: no gross musculoskeletal defects noted, no edema  SKIN: no suspicious lesions or rashes  NEURO: Normal strength and tone, mentation intact and speech normal  PSYCH: mentation appears normal, affect normal/bright    Labs pending     ASSESSMENT / PLAN:       ICD-10-CM    1. Routine general medical examination at a health care facility  Z00.00 Lipid panel reflex to direct LDL Fasting     Comprehensive metabolic panel     CBC with platelets   2. Prostate cancer (H)  C61 PSA tumor marker       Patient has been advised of split billing requirements and indicates understanding: No    COUNSELING:  Reviewed preventive health counseling, as reflected in patient instructions  Special attention given to:       Consider AAA screening for ages 65-75 and smoking " "history; done        Regular exercise       Healthy diet/nutrition       Immunizations    Recommended finishing shingrix series; recommended 4th COVID shot              Hepatitis C screening       HIV screening for high risk patient       Consider lung cancer screening for ages 55-80 years (77 for Medicare) and 20 pack-year smoking history ; never smoker        Colon cancer screening; repeat 2017       Prostate cancer screening; he has a history of prostate cancer     Estimated body mass index is 22 kg/m  as calculated from the following:    Height as of this encounter: 1.805 m (5' 11.06\").    Weight as of this encounter: 71.7 kg (158 lb).        He reports that he has never smoked. He has never used smokeless tobacco.      Appropriate preventive services were discussed with this patient, including applicable screening as appropriate for cardiovascular disease, diabetes, osteopenia/osteoporosis, and glaucoma.  As appropriate for age/gender, discussed screening for colorectal cancer, prostate cancer, breast cancer, and cervical cancer. Checklist reviewing preventive services available has been given to the patient.    Reviewed patients plan of care and provided an AVS. The Basic Care Plan (routine screening as documented in Health Maintenance) for Jacek meets the Care Plan requirement. This Care Plan has been established and reviewed with the Patient.    Counseling Resources:  ATP IV Guidelines  Pooled Cohorts Equation Calculator  Breast Cancer Risk Calculator  Breast Cancer: Medication to Reduce Risk  FRAX Risk Assessment  ICSI Preventive Guidelines  Dietary Guidelines for Americans, 2010  USDA's MyPlate  ASA Prophylaxis  Lung CA Screening    Jay Resendez MD  [facility]    Identified Health Risks:  "

## 2022-07-14 NOTE — LETTER
July 15, 2022      Harvey CHANDRA Chirag  [address] [place] [address]        Dear ,    We are writing to inform you of your test results.    The following letter pertains to your most recent diagnostic tests:     -Your prostate specific antigen (PSA) test result returned normal.     -The cholesterol result is improved since last check.  Nice work.     -Liver and gallbladder tests are normal for you. (ALT,AST, Alk phos, bilirubin), kidney function is normal for you (Creatinine, GFR), Sodium is normal, Potassium is normal for you, Calcium is normal for you, Glucose (blood sugar) is normal for you.       -Your complete blood counts including your hemoglobin returned normal for you.   Your white blood cells have historically run a little lower than what is considered normal dating back to at least 6 years ago.  This in not dangerous for you and we will continue to monitor this annually.           Bottom line:  These results look stable for you       Follow up:  Schedule an appointment for a physical examination with fasting blood tests in one year's time, or return sooner if new questions, symptoms or problems arise.       Resulted Orders   Lipid panel reflex to direct LDL Fasting   Result Value Ref Range    Cholesterol 202 (H) <200 mg/dL    Triglycerides 40 <150 mg/dL    Direct Measure HDL 81 >=40 mg/dL    LDL Cholesterol Calculated 113 (H) <=100 mg/dL    Non HDL Cholesterol 121 <130 mg/dL    Patient Fasting > 8hrs? Yes     Narrative    Cholesterol  Desirable:  <200 mg/dL    Triglycerides  Normal:  Less than 150 mg/dL  Borderline High:  150-199 mg/dL  High:  200-499 mg/dL  Very High:  Greater than or equal to 500 mg/dL    Direct Measure HDL  Female:  Greater than or equal to 50 mg/dL   Male:  Greater than or equal to 40 mg/dL    LDL Cholesterol  Desirable:  <100mg/dL  Above Desirable:  100-129 mg/dL   Borderline High:  130-159 mg/dL   High:  160-189 mg/dL   Very High:  >= 190 mg/dL    Non HDL  Cholesterol  Desirable:  130 mg/dL  Above Desirable:  130-159 mg/dL  Borderline High:  160-189 mg/dL  High:  190-219 mg/dL  Very High:  Greater than or equal to 220 mg/dL   Comprehensive metabolic panel   Result Value Ref Range    Sodium 142 133 - 144 mmol/L    Potassium 4.6 3.4 - 5.3 mmol/L    Chloride 110 (H) 94 - 109 mmol/L    Carbon Dioxide (CO2) 28 20 - 32 mmol/L    Anion Gap 4 3 - 14 mmol/L    Urea Nitrogen 22 7 - 30 mg/dL    Creatinine 0.97 0.66 - 1.25 mg/dL    Calcium 8.9 8.5 - 10.1 mg/dL    Glucose 90 70 - 99 mg/dL    Alkaline Phosphatase 85 40 - 150 U/L    AST 21 0 - 45 U/L    ALT 19 0 - 70 U/L    Protein Total 6.9 6.8 - 8.8 g/dL    Albumin 3.9 3.4 - 5.0 g/dL    Bilirubin Total 1.2 0.2 - 1.3 mg/dL    GFR Estimate 84 >60 mL/min/1.73m2      Comment:      Effective December 21, 2021 eGFRcr in adults is calculated using the 2021 CKD-EPI creatinine equation which includes age and gender (Nessa morales al., [place], DOI: 10.1056/SFGTxr6196278)   CBC with platelets   Result Value Ref Range    WBC Count 3.3 (L) 4.0 - 11.0 10e3/uL    RBC Count 5.12 4.40 - 5.90 10e6/uL    Hemoglobin 15.6 13.3 - 17.7 g/dL    Hematocrit 48.9 40.0 - 53.0 %    MCV 96 78 - 100 fL    MCH 30.5 26.5 - 33.0 pg    MCHC 31.9 31.5 - 36.5 g/dL    RDW 12.3 10.0 - 15.0 %    Platelet Count 209 150 - 450 10e3/uL   PSA tumor marker   Result Value Ref Range    PSA Tumor Marker <0.01 0.00 - 4.00 ug/L       If you have any questions or concerns, please call the clinic at the number listed above.       Sincerely,      Jay Resendez MD

## 2022-07-15 LAB
ALBUMIN SERPL-MCNC: 3.9 G/DL (ref 3.4–5)
ALP SERPL-CCNC: 85 U/L (ref 40–150)
ALT SERPL W P-5'-P-CCNC: 19 U/L (ref 0–70)
ANION GAP SERPL CALCULATED.3IONS-SCNC: 4 MMOL/L (ref 3–14)
AST SERPL W P-5'-P-CCNC: 21 U/L (ref 0–45)
BILIRUB SERPL-MCNC: 1.2 MG/DL (ref 0.2–1.3)
BUN SERPL-MCNC: 22 MG/DL (ref 7–30)
CALCIUM SERPL-MCNC: 8.9 MG/DL (ref 8.5–10.1)
CHLORIDE BLD-SCNC: 110 MMOL/L (ref 94–109)
CHOLEST SERPL-MCNC: 202 MG/DL
CO2 SERPL-SCNC: 28 MMOL/L (ref 20–32)
CREAT SERPL-MCNC: 0.97 MG/DL (ref 0.66–1.25)
FASTING STATUS PATIENT QL REPORTED: YES
GFR SERPL CREATININE-BSD FRML MDRD: 84 ML/MIN/1.73M2
GLUCOSE BLD-MCNC: 90 MG/DL (ref 70–99)
HDLC SERPL-MCNC: 81 MG/DL
LDLC SERPL CALC-MCNC: 113 MG/DL
NONHDLC SERPL-MCNC: 121 MG/DL
POTASSIUM BLD-SCNC: 4.6 MMOL/L (ref 3.4–5.3)
PROT SERPL-MCNC: 6.9 G/DL (ref 6.8–8.8)
PSA SERPL-MCNC: <0.01 UG/L (ref 0–4)
SODIUM SERPL-SCNC: 142 MMOL/L (ref 133–144)
TRIGL SERPL-MCNC: 40 MG/DL

## 2022-07-15 NOTE — RESULT ENCOUNTER NOTE
The following letter pertains to your most recent diagnostic tests:    -Your prostate specific antigen (PSA) test result returned normal.     -The cholesterol result is improved since last check.  Nice work.    -Liver and gallbladder tests are normal for you. (ALT,AST, Alk phos, bilirubin), kidney function is normal for you (Creatinine, GFR), Sodium is normal, Potassium is normal for you, Calcium is normal for you, Glucose (blood sugar) is normal for you.      -Your complete blood counts including your hemoglobin returned normal for you.   Your white blood cells have historically run a little lower than what is considered normal dating back to at least 6 years ago.  This in not dangerous for you and we will continue to monitor this annually.         Bottom line:  These results look stable for you      Follow up:  Schedule an appointment for a physical examination with fasting blood tests in one year's time, or return sooner if new questions, symptoms or problems arise.        Sincerely,    Dr. Resendez

## 2022-09-06 ENCOUNTER — TRANSFERRED RECORDS (OUTPATIENT)
Dept: HEALTH INFORMATION MANAGEMENT | Facility: CLINIC | Age: 72
End: 2022-09-06

## 2022-09-09 ENCOUNTER — TRANSFERRED RECORDS (OUTPATIENT)
Dept: HEALTH INFORMATION MANAGEMENT | Facility: CLINIC | Age: 72
End: 2022-09-09

## 2022-09-12 NOTE — PROGRESS NOTES
Subjective:  HPI  Physical Exam                    Objective:  System    Physical Exam    General     ROS    Assessment/Plan:    DISCHARGE REPORT    Progress reporting period is from 01/31/2022 to 02/24/2022.       SUBJECTIVE  Subjective: When last seen on 02/24/2022, patient reported his R shoulder was still slightly sore with reaching all the way up, but this was only sometimes and is less--1/10.   Current status is unknown as patient did not return for additional follow-up visits.  Current Pain level: 0/10.     Initial Pain level: 2/10.   Changes in function:  Unknown as patient did not return for additional follow-up visits.  Adverse reaction to treatment or activity: Unknown as patient did not return for additional follow-up visits.    OBJECTIVE  Objective:  As of 02/24/2022:  R shoulder AROM:  flexion and abduction min limitation but no pain.         ASSESSMENT/PLAN  Patient was seen for 3 visits with treatment focusing on cervical retraction and extension exercises as well as scapular and rotator cuff strengthening exercises to address R shoulder pain.  He reported improvement at his last visit and planned to follow-up only if needed.  He has not scheduled additional visits so will be discharged at this time.    Updated problem list and treatment plan: Diagnosis 1:  R shoulder pain   No updated problem list or treatment plan as patient did not return for additional visits and is discharged from PT at this time.    STG/LTGs have been met or progress has been made towards goals:  Unknown as patient did not return for additional follow-up visits.  Assessment of Progress: The patient has not returned to therapy. Current status is unknown.  Self Management Plans:  Patient has been instructed in a home treatment program.  Patient  has been instructed in self management of symptoms.  Jacek continues to require the following intervention to meet STG and LTG's:  PT intervention is no longer required to meet  STG/LTG.    Recommendations:  Patient is discharged from PT as he did not return for further follow-up visits.      Please refer to the daily flowsheet for treatment today, total treatment time and time spent performing 1:1 timed codes.

## 2022-10-16 ENCOUNTER — HEALTH MAINTENANCE LETTER (OUTPATIENT)
Age: 72
End: 2022-10-16

## 2023-06-14 ENCOUNTER — PATIENT OUTREACH (OUTPATIENT)
Dept: CARE COORDINATION | Facility: CLINIC | Age: 73
End: 2023-06-14
Payer: MEDICARE

## 2023-07-03 ENCOUNTER — TRANSFERRED RECORDS (OUTPATIENT)
Dept: HEALTH INFORMATION MANAGEMENT | Facility: CLINIC | Age: 73
End: 2023-07-03
Payer: MEDICARE

## 2023-08-26 ENCOUNTER — HEALTH MAINTENANCE LETTER (OUTPATIENT)
Age: 73
End: 2023-08-26

## 2023-11-22 ENCOUNTER — OFFICE VISIT (OUTPATIENT)
Dept: FAMILY MEDICINE | Facility: CLINIC | Age: 73
End: 2023-11-22
Payer: MEDICARE

## 2023-11-22 VITALS
DIASTOLIC BLOOD PRESSURE: 80 MMHG | WEIGHT: 154.2 LBS | RESPIRATION RATE: 20 BRPM | BODY MASS INDEX: 21.47 KG/M2 | OXYGEN SATURATION: 100 % | SYSTOLIC BLOOD PRESSURE: 132 MMHG | TEMPERATURE: 97.3 F | HEART RATE: 85 BPM

## 2023-11-22 DIAGNOSIS — C61 PROSTATE CANCER (H): ICD-10-CM

## 2023-11-22 DIAGNOSIS — Z00.00 ENCOUNTER FOR MEDICARE ANNUAL WELLNESS EXAM: Primary | ICD-10-CM

## 2023-11-22 LAB
ALBUMIN SERPL BCG-MCNC: 4.5 G/DL (ref 3.5–5.2)
ALP SERPL-CCNC: 79 U/L (ref 40–150)
ALT SERPL W P-5'-P-CCNC: 15 U/L (ref 0–70)
ANION GAP SERPL CALCULATED.3IONS-SCNC: 12 MMOL/L (ref 7–15)
AST SERPL W P-5'-P-CCNC: 25 U/L (ref 0–45)
BILIRUB SERPL-MCNC: 0.9 MG/DL
BUN SERPL-MCNC: 15 MG/DL (ref 8–23)
CALCIUM SERPL-MCNC: 9.3 MG/DL (ref 8.8–10.2)
CHLORIDE SERPL-SCNC: 103 MMOL/L (ref 98–107)
CHOLEST SERPL-MCNC: 216 MG/DL
CREAT SERPL-MCNC: 0.98 MG/DL (ref 0.67–1.17)
DEPRECATED HCO3 PLAS-SCNC: 28 MMOL/L (ref 22–29)
EGFRCR SERPLBLD CKD-EPI 2021: 82 ML/MIN/1.73M2
ERYTHROCYTE [DISTWIDTH] IN BLOOD BY AUTOMATED COUNT: 11.9 % (ref 10–15)
GLUCOSE SERPL-MCNC: 82 MG/DL (ref 70–99)
HCT VFR BLD AUTO: 50.3 % (ref 40–53)
HDLC SERPL-MCNC: 83 MG/DL
HGB BLD-MCNC: 16 G/DL (ref 13.3–17.7)
LDLC SERPL CALC-MCNC: 126 MG/DL
MCH RBC QN AUTO: 30.5 PG (ref 26.5–33)
MCHC RBC AUTO-ENTMCNC: 31.8 G/DL (ref 31.5–36.5)
MCV RBC AUTO: 96 FL (ref 78–100)
NONHDLC SERPL-MCNC: 133 MG/DL
PLATELET # BLD AUTO: 218 10E3/UL (ref 150–450)
POTASSIUM SERPL-SCNC: 4.3 MMOL/L (ref 3.4–5.3)
PROT SERPL-MCNC: 6.9 G/DL (ref 6.4–8.3)
PSA SERPL DL<=0.01 NG/ML-MCNC: <0.01 NG/ML (ref 0–6.5)
RBC # BLD AUTO: 5.24 10E6/UL (ref 4.4–5.9)
SODIUM SERPL-SCNC: 143 MMOL/L (ref 135–145)
TRIGL SERPL-MCNC: 37 MG/DL
WBC # BLD AUTO: 4.4 10E3/UL (ref 4–11)

## 2023-11-22 PROCEDURE — 36415 COLL VENOUS BLD VENIPUNCTURE: CPT | Performed by: INTERNAL MEDICINE

## 2023-11-22 PROCEDURE — 85027 COMPLETE CBC AUTOMATED: CPT | Performed by: INTERNAL MEDICINE

## 2023-11-22 PROCEDURE — 80053 COMPREHEN METABOLIC PANEL: CPT | Performed by: INTERNAL MEDICINE

## 2023-11-22 PROCEDURE — 90662 IIV NO PRSV INCREASED AG IM: CPT | Performed by: INTERNAL MEDICINE

## 2023-11-22 PROCEDURE — G0008 ADMIN INFLUENZA VIRUS VAC: HCPCS | Performed by: INTERNAL MEDICINE

## 2023-11-22 PROCEDURE — 99213 OFFICE O/P EST LOW 20 MIN: CPT | Mod: 25 | Performed by: INTERNAL MEDICINE

## 2023-11-22 PROCEDURE — 84153 ASSAY OF PSA TOTAL: CPT | Performed by: INTERNAL MEDICINE

## 2023-11-22 PROCEDURE — 80061 LIPID PANEL: CPT | Performed by: INTERNAL MEDICINE

## 2023-11-22 PROCEDURE — G0439 PPPS, SUBSEQ VISIT: HCPCS | Performed by: INTERNAL MEDICINE

## 2023-11-22 RX ORDER — RESPIRATORY SYNCYTIAL VIRUS VACCINE 120MCG/0.5
0.5 KIT INTRAMUSCULAR ONCE
Qty: 1 EACH | Refills: 0 | Status: CANCELLED | OUTPATIENT
Start: 2023-11-22 | End: 2023-11-22

## 2023-11-22 ASSESSMENT — ENCOUNTER SYMPTOMS
DYSURIA: 0
HEMATURIA: 1
ARTHRALGIAS: 0
CHILLS: 0
NAUSEA: 0
SHORTNESS OF BREATH: 0
MYALGIAS: 0
FREQUENCY: 0
PARESTHESIAS: 0
EYE PAIN: 0
NERVOUS/ANXIOUS: 0
JOINT SWELLING: 0
HEARTBURN: 0
WEAKNESS: 0
SORE THROAT: 0
HEMATOCHEZIA: 0
FEVER: 0
DIARRHEA: 0
HEADACHES: 0
ABDOMINAL PAIN: 0
PALPITATIONS: 0
COUGH: 0
DIZZINESS: 1
CONSTIPATION: 0

## 2023-11-22 ASSESSMENT — ACTIVITIES OF DAILY LIVING (ADL): CURRENT_FUNCTION: NO ASSISTANCE NEEDED

## 2023-11-22 ASSESSMENT — PAIN SCALES - GENERAL: PAINLEVEL: NO PAIN (0)

## 2023-11-22 NOTE — PATIENT INSTRUCTIONS
Patient Education   Personalized Prevention Plan  You are due for the preventive services outlined below.  Your care team is available to assist you in scheduling these services.  If you have already completed any of these items, please share that information with your care team to update in your medical record.  Health Maintenance Due   Topic Date Due     Hepatitis A Vaccine (1 of 2 - Risk 2-dose series) Never done     Hepatitis B Vaccine (1 of 3 - Risk 3-dose series) Never done     RSV VACCINE (Pregnancy & 60+) (1 - 1-dose 60+ series) Never done     Annual Wellness Visit  07/14/2023     ANNUAL REVIEW OF HM ORDERS  07/14/2023     Flu Vaccine (1) 09/01/2023     COVID-19 Vaccine (5 - 2023-24 season) 09/01/2023     Learning About Dietary Guidelines  What are the Dietary Guidelines for Americans?     Dietary Guidelines for Americans provide tips for eating well and staying healthy. This helps reduce the risk for long-term (chronic) diseases.  These guidelines recommend that you:  Eat and drink the right amount for you. The U.[place] government's food guide is called MyPlate. It can help you make your own well-balanced eating plan.  Try to balance your eating with your activity. This helps you stay at a healthy weight.  Drink alcohol in moderation, if at all.  Limit foods high in salt, saturated fat, trans fat, and added sugar.  These guidelines are from the U.S. Department of Agriculture and the U.S. Department of Health and Human Services. They are updated every 5 years.  What is MyPlate?  MyPlate is the U.[place]. government's food guide. It can help you make your own well-balanced eating plan. A balanced eating plan means that you eat enough, but not too much, and that your food gives you the nutrients you need to stay healthy.  MyPlate focuses on eating plenty of whole grains, fruits, and vegetables, and on limiting fat and sugar. It is available online at www.ChooseMyPlate.gov.  How can you get started?  If you're trying to  "eat healthier, you can slowly change your eating habits over time. You don't have to make big changes all at once. Start by adding one or two healthy foods to your meals each day.  Grains  Choose whole-grain breads and cereals and whole-wheat pasta and whole-grain crackers.  Vegetables  Eat a variety of vegetables every day. They have lots of nutrients and are part of a heart-healthy diet.  Fruits  Eat a variety of fruits every day. Fruits contain lots of nutrients. Choose fresh fruit instead of fruit juice.  Protein foods  Choose fish and lean poultry more often. Eat red meat and fried meats less often. Dried beans, tofu, and nuts are also good sources of protein.  Dairy  Choose low-fat or fat-free products from this food group. If you have problems digesting milk, try eating cheese or yogurt instead.  Fats and oils  Limit fats and oils if you're trying to cut calories. Choose healthy fats when you cook. These include canola oil and olive oil.  Where can you learn more?  Go to https://www.[employer].net/patiented  Enter D676 in the search box to learn more about \"Learning About Dietary Guidelines.\"  Current as of: February 28, 2023               Content Version: 13.8    0308-9727 [employer].   Care instructions adapted under license by your healthcare professional. If you have questions about a medical condition or this instruction, always ask your healthcare professional. [employer] disclaims any warranty or liability for your use of this information.      Hearing Loss: Care Instructions  Overview     Hearing loss is a sudden or slow decrease in how well you hear. It can range from slight to profound. Permanent hearing loss can occur with aging. It also can happen when you are exposed long-term to loud noise. Examples include listening to loud music, riding motorcycles, or being around other loud machines.  Hearing loss can affect your work and home life. It can make you feel lonely or " depressed. You may feel that you have lost your independence. But hearing aids and other devices can help you hear better and feel connected to others.  Follow-up care is a key part of your treatment and safety. Be sure to make and go to all appointments, and call your doctor if you are having problems. It's also a good idea to know your test results and keep a list of the medicines you take.  How can you care for yourself at home?  Avoid loud noises whenever possible. This helps keep your hearing from getting worse.  Always wear hearing protection around loud noises.  Wear a hearing aid as directed.  A professional can help you pick a hearing aid that will work best for you.  You can also get hearing aids over the counter for mild to moderate hearing loss.  Have hearing tests as your doctor suggests. They can show whether your hearing has changed. Your hearing aid may need to be adjusted.  Use other devices as needed. These may include:  Telephone amplifiers and hearing aids that can connect to a television, stereo, radio, or microphone.  Devices that use lights or vibrations. These alert you to the doorbell, a ringing telephone, or a baby monitor.  Television closed-captioning. This shows the words at the bottom of the screen. Most new TVs can do this.  TTY (text telephone). This lets you type messages back and forth on the telephone instead of talking or listening. These devices are also called TDD. When messages are typed on the keyboard, they are sent over the phone line to a receiving TTY. The message is shown on a monitor.  Use text messaging, social media, and email if it is hard for you to communicate by telephone.  Try to learn a listening technique called speechreading. It is not lipreading. You pay attention to people's gestures, expressions, posture, and tone of voice. These clues can help you understand what a person is saying. Face the person you are talking to, and have them face you. Make sure the  "lighting is good. You need to see the other person's face clearly.  Think about counseling if you need help to adjust to your hearing loss.  When should you call for help?  Watch closely for changes in your health, and be sure to contact your doctor if:    You think your hearing is getting worse.     You have new symptoms, such as dizziness or nausea.   Where can you learn more?  Go to https://www.[employer].net/patiented  Enter R798 in the search box to learn more about \"Hearing Loss: Care Instructions.\"  Current as of: February 28, 2023               Content Version: 13.8    2633-4978 [employer].   Care instructions adapted under license by your healthcare professional. If you have questions about a medical condition or this instruction, always ask your healthcare professional. [employer] disclaims any warranty or liability for your use of this information.         "

## 2023-11-22 NOTE — PROGRESS NOTES
"SUBJECTIVE:   Harvey is a 73 year old, presenting for the following:  Physical (Patient is here for an Annual Wellness Visit.)        Are you in the first 12 months of your Medicare coverage?  No    Healthy Habits:     In general, how would you rate your overall health?  Good    Frequency of exercise:  4-5 days/week    Duration of exercise:  15-30 minutes    Do you usually eat at least 4 servings of fruit and vegetables a day, include whole grains    & fiber and avoid regularly eating high fat or \"junk\" foods?  No    Taking medications regularly:  Yes    Medication side effects:  Not applicable    Ability to successfully perform activities of daily living:  No assistance needed    Home Safety:  No safety concerns identified    Hearing Impairment:  Difficult to understand a speaker at a public meeting or [employer] service, need to ask people to speak up or repeat themselves and difficulty understanding soft or whispered speech    In the past 6 months, have you been bothered by leaking of urine?  No    In general, how would you rate your overall mental or emotional health?  Excellent    Additional concerns today:  No          Have you ever done Advance Care Planning? (For example, a Health Directive, POLST, or a discussion with a medical provider or your loved ones about your wishes): Yes, patient states has an Advance Care Planning document and will bring a copy to the clinic.       Fall risk  Fallen 2 or more times in the past year?: No  Any fall with injury in the past year?: No  click delete button to remove this line now  Cognitive Screening   1) Repeat 3 items (Leader, Season, Table)    2) Clock draw: NORMAL  3) 3 item recall: Recalls 3 objects  Results: 3 items recalled: COGNITIVE IMPAIRMENT LESS LIKELY    Mini-CogTM Copyright MARIBEL Sierra. Licensed by the author for use in [facility]; reprinted with permission (thomas@.[facility]). All rights reserved.      Do you have sleep apnea, excessive snoring or daytime " drowsiness? : no    Reviewed and updated as needed this visit by clinical staff    Allergies  Meds              Reviewed and updated as needed this visit by Provider                 Social History     Tobacco Use    Smoking status: Never    Smokeless tobacco: Never   Substance Use Topics    Alcohol use: Yes     Alcohol/week: 4.0 - 5.0 standard drinks of alcohol     Comment: 2 beers  a couple times a week             11/22/2023     1:51 PM   Alcohol Use   Prescreen: >3 drinks/day or >7 drinks/week? No     Do you have a current opioid prescription? No  Do you use any other controlled substances or medications that are not prescribed by a provider? None              Current providers sharing in care for this patient include:   Patient Care Team:  Jay Resendez MD as PCP - General (Internal Medicine)  Jay Resendez MD as Assigned PCP    The following health maintenance items are reviewed in Epic and correct as of today:  Health Maintenance   Topic Date Due    HEPATITIS A IMMUNIZATION (1 of 2 - Risk 2-dose series) Never done    HEPATITIS B IMMUNIZATION (1 of 3 - Risk 3-dose series) Never done    RSV VACCINE (Pregnancy & 60+) (1 - 1-dose 60+ series) Never done    MEDICARE ANNUAL WELLNESS VISIT  07/14/2023    INFLUENZA VACCINE (1) 09/01/2023    COVID-19 Vaccine (5 - 2023-24 season) 09/01/2023    ANNUAL REVIEW OF HM ORDERS  11/22/2024    FALL RISK ASSESSMENT  11/22/2024    DTAP/TDAP/TD IMMUNIZATION (2 - Td or Tdap) 12/21/2025    LIPID  07/14/2027    COLORECTAL CANCER SCREENING  10/12/2027    ADVANCE CARE PLANNING  11/22/2028    HEPATITIS C SCREENING  Completed    PHQ-2 (once per calendar year)  Completed    Pneumococcal Vaccine: 65+ Years  Completed    ZOSTER IMMUNIZATION  Completed    AORTIC ANEURYSM SCREENING (SYSTEM ASSIGNED)  Completed    IPV IMMUNIZATION  Aged Out    HPV IMMUNIZATION  Aged Out    MENINGITIS IMMUNIZATION  Aged Out    RSV MONOCLONAL ANTIBODY  Aged Out     BP Readings from Last 3 Encounters:    11/22/23 132/80   07/14/22 126/74   03/25/21 (!) 141/89    Wt Readings from Last 3 Encounters:   11/22/23 69.9 kg (154 lb 3.2 oz)   07/14/22 71.7 kg (158 lb)   03/25/21 74.5 kg (164 lb 3.2 oz)                  Patient Active Problem List   Diagnosis    Mitral valve disorder    HYPERLIPIDEMIA LDL GOAL <160    Prostate cancer (H)    Leukopenia    Advanced directives, counseling/discussion    Cervical pain    Chronic right shoulder pain     Past Surgical History:   Procedure Laterality Date    BIOPSY  April 2010    RE: Prostate Cancer biopsies.    COLONOSCOPY  2007    due in 10 yrs    COLONOSCOPY N/A 10/12/2017    Procedure: COMBINED COLONOSCOPY, SINGLE OR MULTIPLE BIOPSY/POLYPECTOMY BY BIOPSY;  COLONOSCOPY;  Surgeon: Wilmer Mendez MD;  Location:  GI    COLONOSCOPY  2017    PROSTATE BIOPSY, NEEDLE, SATURATION SAMPLING         Social History     Tobacco Use    Smoking status: Never    Smokeless tobacco: Never   Substance Use Topics    Alcohol use: Yes     Alcohol/week: 4.0 - 5.0 standard drinks of alcohol     Comment: 2 beers  a couple times a week     Family History   Problem Relation Age of Onset    Diabetes Brother     Hypertension Brother     Diabetes Father         Type II    Prostate Cancer Father         2005    Hypertension Father         HBP Medication    Diabetes Brother         Type II    Hypertension Brother         HBP Medication    Prostate Cancer Brother         2008?    Hypertension Brother         HBP Medication    Cerebrovascular Disease Mother         Age 95 -    C.A.D. No family hx of     Colon Cancer No family hx of          Current Outpatient Medications   Medication Sig Dispense Refill    Cholecalciferol (VITAMIN D) 50 MCG (2000 UT) CAPS       Krill Oil CAPS Take 1 capsule by mouth daily      triamcinolone (KENALOG) 0.1 % cream Apply 0.5 inches topically once a week. As needed   Pt avoids over use               Review of Systems   Constitutional:  Negative for chills and fever.  "  HENT:  Negative for congestion, ear pain, hearing loss and sore throat.    Eyes:  Negative for pain and visual disturbance.   Respiratory:  Negative for cough and shortness of breath.    Cardiovascular:  Negative for chest pain, palpitations and peripheral edema.   Gastrointestinal:  Negative for abdominal pain, constipation, diarrhea, heartburn, hematochezia and nausea.   Genitourinary:  Positive for hematuria. Negative for dysuria, frequency, genital sores, impotence, penile discharge and urgency.   Musculoskeletal:  Negative for arthralgias, joint swelling and myalgias.   Skin:  Negative for rash.   Neurological:  Positive for dizziness. Negative for weakness, headaches and paresthesias.   Psychiatric/Behavioral:  Negative for mood changes. The patient is not nervous/anxious.      He has had some gross hematuria without symptoms  He had a CT showing no obvious upper tract source, I advised returning to [place] [facility] to discuss cystoscopy since hematuria is persistent, he has received radiation therapy for prostate cancer; he had vertigo episodes lasting seconds several weeks ago, none recently; could be BPPV, return to clinic if symptoms return     OBJECTIVE:   /80 (BP Location: Right arm, Patient Position: Sitting, Cuff Size: Adult Regular)   Pulse 85   Temp 97.3  F (36.3  C) (Temporal)   Resp 20   Wt 69.9 kg (154 lb 3.2 oz)   SpO2 100%   BMI 21.47 kg/m   Estimated body mass index is 21.47 kg/m  as calculated from the following:    Height as of 7/14/22: 1.805 m (5' 11.06\").    Weight as of this encounter: 69.9 kg (154 lb 3.2 oz).  Physical Exam  GENERAL: healthy, alert and no distress  EYES: Eyes grossly normal to inspection, PERRL and conjunctivae and sclerae normal  HENT: ear canals and TM's normal, nose and mouth without ulcers or lesions  NECK: no adenopathy, no asymmetry, masses, or scars and thyroid normal to palpation  RESP: lungs clear to auscultation - no rales, rhonchi or wheezes  CV: " regular rate and rhythm, normal S1 S2, no S3 or S4, no murmur, click or rub, no peripheral edema and peripheral pulses strong  ABDOMEN: soft, nontender, no hepatosplenomegaly, no masses and bowel sounds normal  MS: no gross musculoskeletal defects noted, no edema  SKIN: no suspicious lesions or rashes  NEURO: Normal strength and tone, mentation intact and speech normal  PSYCH: mentation appears normal, affect normal/bright        ASSESSMENT / PLAN:       ICD-10-CM    1. Encounter for Medicare annual wellness exam  Z00.00 Lipid panel reflex to direct LDL Fasting     Comprehensive metabolic panel     CBC with platelets      2. Prostate cancer (H)  C61 PSA tumor marker                COUNSELING:  Reviewed preventive health counseling, as reflected in patient instructions  Special attention given to:       Consider AAA screening for ages 65-75 and smoking history       Regular exercise       Healthy diet/nutrition       Immunizations  Recommended flu shot and COVID shot; discussed RSV shot            Consider lung cancer screening for ages 55-80 years (77 for Medicare) and 20 pack-year smoking history ; never smoker        Colon cancer screening; recheck 2027       Prostate cancer screening; check tumor marker         He reports that he has never smoked. He has never used smokeless tobacco.      Appropriate preventive services were discussed with this patient, including applicable screening as appropriate for fall prevention, nutrition, physical activity, Tobacco-use cessation, weight loss and cognition.  Checklist reviewing preventive services available has been given to the patient.    Reviewed patients plan of care and provided an AVS. The Basic Care Plan (routine screening as documented in Health Maintenance) for Jacek meets the Care Plan requirement. This Care Plan has been established and reviewed with the Patient.        Jay Resendez MD  [facility]    Identified Health Risks:

## 2023-11-24 NOTE — RESULT ENCOUNTER NOTE
The following letter pertains to your most recent diagnostic tests:    -The cholesterol is a little higher than last year.  Please see note below.      -Your prostate specific antigen (PSA) test result returned normal.     -Liver and gallbladder tests are normal for you. (ALT,AST, Alk phos, bilirubin), kidney function is normal for you (Creatinine, GFR), Sodium is normal, Potassium is normal for you, Calcium is normal for you, Glucose (blood sugar) is normal for you.      -Your complete blood counts including your hemoglobin returned normal for you.           Bottom line:  These results look OK except for your cholesterol which has increased slightly since last check.       Based on your current cholesterol levels and your other risk factors, I estimate a 17.1% statistical chance for you developing significant blood vessel blockage over the next 10 years.  We call this statistic a 10-year atherosclerotic coronary vascular disease (ASCVD) risk score.    Anyone with an ASCVD risk score greater than 7.5% is considered to have elevated risk for significant blood vessel blockage.    Since your ASCVD risk score is higher than 7.5%, we want to make sure we are doing everything possible to reduce your chances of developing significant blockage.      We believe that statin medications such as atorvastatin (Lipitor) have protective effects in the blood vessels that extend beyond their ability to simply lower blood cholesterol numbers.  We know statin medications prevent the accumulation of blockage in blood vessels and we also know that statin medication stabilize and shrink existing blood vessel blockages.     Current expert guidelines recommend starting statin medication therapy for individuals with ASCVD risk scores greater than 7.5%.  Therefore, I would recommend that you trying taking atorvastatin (Lipitor).     You should be informed that a very small minority of people who take atorvastatin can develop muscle pain and  weakness as a side effect from that drug.  If you experience those side effects, then stop the drug and contact us.      If you start the medication, you should have a follow up fasting cholesterol panel after you have been taking the medication for 1-2 months.  You can schedule a lab appointment for that purpose.      Please contact us if you would like to get that medication started so we can send a prescritpion and arrange for appropriate follow up.       I would certainly understand if you have questions about this.  You are certainly more than welcome to schedule a virtual visit (telephone or video visit) with me to discuss this matter further and get all your questions answered if you have them.      Taking statins certainly does not get you off the hook for living a healthy lifestyle.  Even if you decide to start the atorvastatin (Lipitor), it would still be important to eat a diet low in saturated fats and high in fiber to control cholesterol and blood vessel disease risk.  Increase servings of fruits and vegetables!  Physical activity is also a very important way to modulate blood vessel disease risk.  A  good target to shoot for is 150 minutes (two and one-half hours) of moderate intensity physical activity per week.        Follow up:  Inform us if you would like to get atorvastatin (Lipitor) started.   Otherwise, Schedule an appointment for a preventive examination in one year's time, or return sooner if new questions, symptoms or problems arise.        Sincerely,    Dr. Resendez    The 10-year ASCVD risk score (Lauren FLORES, et al., 2019) is: 17.1%    Values used to calculate the score:      Age: 73 years      Sex: Male      Is Non- : No      Diabetic: No      Tobacco smoker: No      Systolic Blood Pressure: 132 mmHg      Is BP treated: No      HDL Cholesterol: 83 mg/dL      Total Cholesterol: 216 mg/dL

## 2024-11-26 ENCOUNTER — OFFICE VISIT (OUTPATIENT)
Dept: FAMILY MEDICINE | Facility: CLINIC | Age: 74
End: 2024-11-26
Payer: MEDICARE

## 2024-11-26 VITALS
BODY MASS INDEX: 21.99 KG/M2 | OXYGEN SATURATION: 99 % | DIASTOLIC BLOOD PRESSURE: 80 MMHG | HEIGHT: 71 IN | SYSTOLIC BLOOD PRESSURE: 133 MMHG | RESPIRATION RATE: 16 BRPM | TEMPERATURE: 97.1 F | HEART RATE: 71 BPM | WEIGHT: 157.1 LBS

## 2024-11-26 DIAGNOSIS — E78.5 HYPERLIPIDEMIA LDL GOAL <100: ICD-10-CM

## 2024-11-26 DIAGNOSIS — Z00.00 ENCOUNTER FOR MEDICARE ANNUAL WELLNESS EXAM: Primary | ICD-10-CM

## 2024-11-26 DIAGNOSIS — C61 PROSTATE CANCER (H): ICD-10-CM

## 2024-11-26 LAB
ERYTHROCYTE [DISTWIDTH] IN BLOOD BY AUTOMATED COUNT: 12.3 % (ref 10–15)
EST. AVERAGE GLUCOSE BLD GHB EST-MCNC: 108 MG/DL
HBA1C MFR BLD: 5.4 % (ref 0–5.6)
HCT VFR BLD AUTO: 49.9 % (ref 40–53)
HGB BLD-MCNC: 15.6 G/DL (ref 13.3–17.7)
MCH RBC QN AUTO: 30.1 PG (ref 26.5–33)
MCHC RBC AUTO-ENTMCNC: 31.3 G/DL (ref 31.5–36.5)
MCV RBC AUTO: 96 FL (ref 78–100)
PLATELET # BLD AUTO: 205 10E3/UL (ref 150–450)
RBC # BLD AUTO: 5.19 10E6/UL (ref 4.4–5.9)
WBC # BLD AUTO: 4.7 10E3/UL (ref 4–11)

## 2024-11-26 PROCEDURE — 80053 COMPREHEN METABOLIC PANEL: CPT | Performed by: INTERNAL MEDICINE

## 2024-11-26 PROCEDURE — 90480 ADMN SARSCOV2 VAC 1/ONLY CMP: CPT | Performed by: INTERNAL MEDICINE

## 2024-11-26 PROCEDURE — G0439 PPPS, SUBSEQ VISIT: HCPCS | Performed by: INTERNAL MEDICINE

## 2024-11-26 PROCEDURE — 91320 SARSCV2 VAC 30MCG TRS-SUC IM: CPT | Performed by: INTERNAL MEDICINE

## 2024-11-26 PROCEDURE — 83036 HEMOGLOBIN GLYCOSYLATED A1C: CPT | Mod: GZ | Performed by: INTERNAL MEDICINE

## 2024-11-26 PROCEDURE — 36415 COLL VENOUS BLD VENIPUNCTURE: CPT | Performed by: INTERNAL MEDICINE

## 2024-11-26 PROCEDURE — G0008 ADMIN INFLUENZA VIRUS VAC: HCPCS | Performed by: INTERNAL MEDICINE

## 2024-11-26 PROCEDURE — 80061 LIPID PANEL: CPT | Performed by: INTERNAL MEDICINE

## 2024-11-26 PROCEDURE — 90662 IIV NO PRSV INCREASED AG IM: CPT | Performed by: INTERNAL MEDICINE

## 2024-11-26 PROCEDURE — 84153 ASSAY OF PSA TOTAL: CPT | Performed by: INTERNAL MEDICINE

## 2024-11-26 PROCEDURE — 85027 COMPLETE CBC AUTOMATED: CPT | Performed by: INTERNAL MEDICINE

## 2024-11-26 SDOH — HEALTH STABILITY: PHYSICAL HEALTH: ON AVERAGE, HOW MANY DAYS PER WEEK DO YOU ENGAGE IN MODERATE TO STRENUOUS EXERCISE (LIKE A BRISK WALK)?: 4 DAYS

## 2024-11-26 SDOH — HEALTH STABILITY: PHYSICAL HEALTH: ON AVERAGE, HOW MANY MINUTES DO YOU ENGAGE IN EXERCISE AT THIS LEVEL?: 30 MIN

## 2024-11-26 ASSESSMENT — SOCIAL DETERMINANTS OF HEALTH (SDOH): HOW OFTEN DO YOU GET TOGETHER WITH FRIENDS OR RELATIVES?: MORE THAN THREE TIMES A WEEK

## 2024-11-26 ASSESSMENT — PAIN SCALES - GENERAL: PAINLEVEL_OUTOF10: NO PAIN (0)

## 2024-11-26 NOTE — PROGRESS NOTES
Preventive Care Visit  [facility]  Jay Resendez MD, Internal Medicine  Nov 26, 2024      Assessment & Plan     Encounter for Medicare annual wellness exam  Discussed rationale of statin therapy for primary prevention of ASCVD.  Discussed risks and common side effects of statin therapy.  Discussed appropriate follow up if statin started. Discussed role of CAC scoring in risk stratification as well.   - Lipid panel reflex to direct LDL Non-fasting; Future  - Comprehensive metabolic panel; Future  - CBC with platelets; Future  - HEMOGLOBIN A1C; Future    Prostate cancer (H)    - PSA tumor marker; Future    Patient has been advised of split billing requirements and indicates understanding: Yes        Counseling  Appropriate preventive services were addressed with this patient via screening, questionnaire, or discussion as appropriate for fall prevention, nutrition, physical activity, Tobacco-use cessation, social engagement, weight loss and cognition.  Checklist reviewing preventive services available has been given to the patient.  Reviewed patient's diet, addressing concerns and/or questions.   The patient was provided with written information regarding signs of hearing loss.       FUTURE APPOINTMENTS:       - Follow-up for annual visit or as needed    Subjective   Harvey is a 74 year old, presenting for the following:  Physical        11/26/2024     3:41 PM   Additional Questions   Roomed by Cathy   Accompanied by none           HPI          Health Care Directive  Patient does not have a Health Care Directive: Patient states has Advance Directive and will bring in a copy to clinic.      11/26/2024   General Health   How would you rate your overall physical health? Good   Feel stress (tense, anxious, or unable to sleep) Not at all            11/26/2024   Nutrition   Diet: Regular (no restrictions)            11/26/2024   Exercise   Days per week of moderate/strenous exercise 4 days   Average  minutes spent exercising at this level 30 min            11/26/2024   Social Factors   Frequency of gathering with friends or relatives More than three times a week   Worry food won't last until get money to buy more No   Food not last or not have enough money for food? No   Do you have housing? (Housing is defined as stable permanent housing and does not include staying ouside in a car, in a tent, in an abandoned building, in an overnight shelter, or couch-surfing.) Yes   Are you worried about losing your housing? No   Lack of transportation? No   Unable to get utilities (heat,electricity)? No            11/26/2024   Fall Risk   Fallen 2 or more times in the past year? No     No    Trouble with walking or balance? No     No        Patient-reported    Multiple values from one day are sorted in reverse-chronological order          11/26/2024   Activities of Daily Living- Home Safety   Needs help with the following daily activites None of the above   Safety concerns in the home None of the above            11/26/2024   Dental   Dentist two times every year? Yes            11/26/2024   Hearing Screening   Hearing concerns? (!) IT'S HARDER TO UNDERSTAND WOMEN'S VOICES THAN MEN'S VOICES.    (!) TROUBLE UNDERSTANDING SOFT OR WHISPERED SPEECH.       Multiple values from one day are sorted in reverse-chronological order         11/26/2024   Driving Risk Screening   Patient/family members have concerns about driving No            11/26/2024   General Alertness/Fatigue Screening   Have you been more tired than usual lately? No            11/26/2024   Urinary Incontinence Screening   Bothered by leaking urine in past 6 months No            11/26/2024   TB Screening   Were you born outside of the [place]? No            Today's PHQ-2 Score:       11/26/2024    11:46 AM   PHQ-2 ( 1999 Pf[employer])   Q1: Little interest or pleasure in doing things 0    Q2: Feeling down, depressed or hopeless 0    PHQ-2 Score 0    Q1: Little interest or  pleasure in doing things Not at all   Q2: Feeling down, depressed or hopeless Not at all   PHQ-2 Score 0       Patient-reported           11/26/2024   Substance Use   Alcohol more than 3/day or more than 7/wk No   Do you have a current opioid prescription? No   How severe/bad is pain from 1 to 10? 0/10 (No Pain)   Do you use any other substances recreationally? No        Social History     Tobacco Use    Smoking status: Never    Smokeless tobacco: Never   Substance Use Topics    Alcohol use: Yes     Alcohol/week: 4.0 - 5.0 standard drinks of alcohol     Comment: 2 beers  a couple times a week    Drug use: No           11/26/2024   AAA Screening   Family history of Abdominal Aortic Aneurysm (AAA)? No      ASCVD Risk   The 10-year ASCVD risk score (Lauren FLORES, et al., 2019) is: 18.6%    Values used to calculate the score:      Age: 74 years      Sex: Male      Is Non- : No      Diabetic: No      Tobacco smoker: No      Systolic Blood Pressure: 133 mmHg      Is BP treated: No      HDL Cholesterol: 83 mg/dL      Total Cholesterol: 216 mg/dL            Reviewed and updated as needed this visit by Provider     Meds  Problems               Past Medical History:   Diagnosis Date    Cervicalgia     Eczema     followed by dermatology    Leukopenia     chronic, benign    Precordial pain 2004    negative eval at [place] except pectus excavatus    Prostate cancer (H) 2010    seed implants + radiation    Shingles 2010    SVT (supraventricular tachycardia) (H)     negative cardiac workup, EF 50% on 2D-echo    Uncomplicated asthma 1985?    Bronchial Asthma - not a problem now.     Past Surgical History:   Procedure Laterality Date    BIOPSY  April 2010    RE: Prostate Cancer biopsies.    COLONOSCOPY  2007    due in 10 yrs    COLONOSCOPY N/A 10/12/2017    Procedure: COMBINED COLONOSCOPY, SINGLE OR MULTIPLE BIOPSY/POLYPECTOMY BY BIOPSY;  COLONOSCOPY;  Surgeon: Wilmer Mendez MD;  Location:  GI     COLONOSCOPY  2017    PROSTATE BIOPSY, NEEDLE, SATURATION SAMPLING       BP Readings from Last 3 Encounters:   11/26/24 133/80   11/22/23 132/80   07/14/22 126/74    Wt Readings from Last 3 Encounters:   11/26/24 71.3 kg (157 lb 1.6 oz)   11/22/23 69.9 kg (154 lb 3.2 oz)   07/14/22 71.7 kg (158 lb)                  Patient Active Problem List   Diagnosis    Mitral valve disorder    Hyperlipidemia LDL goal <100    Prostate cancer (H)    Leukopenia    Cervical pain    Chronic right shoulder pain     Past Surgical History:   Procedure Laterality Date    BIOPSY  April 2010    RE: Prostate Cancer biopsies.    COLONOSCOPY  2007    due in 10 yrs    COLONOSCOPY N/A 10/12/2017    Procedure: COMBINED COLONOSCOPY, SINGLE OR MULTIPLE BIOPSY/POLYPECTOMY BY BIOPSY;  COLONOSCOPY;  Surgeon: Wilmer Mendez MD;  Location:  GI    COLONOSCOPY  2017    PROSTATE BIOPSY, NEEDLE, SATURATION SAMPLING         Social History     Tobacco Use    Smoking status: Never    Smokeless tobacco: Never   Substance Use Topics    Alcohol use: Yes     Alcohol/week: 4.0 - 5.0 standard drinks of alcohol     Comment: 2 beers  a couple times a week     Family History   Problem Relation Age of Onset    Diabetes Brother     Hypertension Brother     Diabetes Father         Type II    Prostate Cancer Father         2005    Hypertension Father         HBP Medication    Diabetes Brother         Type II    Hypertension Brother         HBP Medication    Prostate Cancer Brother         2008?    Hypertension Brother         HBP Medication    Cerebrovascular Disease Mother         Age 95 -    C.A.D. No family hx of     Colon Cancer No family hx of          Current Outpatient Medications   Medication Sig Dispense Refill    Cholecalciferol (VITAMIN D) 50 MCG (2000 UT) CAPS       Krill Oil CAPS Take 1 capsule by mouth daily      triamcinolone (KENALOG) 0.1 % cream Apply 0.5 inches topically once a week. As needed   Pt avoids over use       Current providers  "sharing in care for this patient include:  Patient Care Team:  Jay Resendez MD as PCP - General (Internal Medicine)  Jay Resendez MD as Assigned PCP    The following health maintenance items are reviewed in Epic and correct as of today:  Health Maintenance   Topic Date Due    HEPATITIS A IMMUNIZATION (1 of 2 - Risk 2-dose series) Never done    HEPATITIS B IMMUNIZATION (1 of 3 - Risk 3-dose series) Never done    INFLUENZA VACCINE (1) 09/01/2024    COVID-19 Vaccine (6 - 2024-25 season) 09/01/2024    LIPID  11/22/2024    MEDICARE ANNUAL WELLNESS VISIT  11/26/2025    ANNUAL REVIEW OF HM ORDERS  11/26/2025    FALL RISK ASSESSMENT  11/26/2025    DTAP/TDAP/TD IMMUNIZATION (2 - Td or Tdap) 12/21/2025    GLUCOSE  11/22/2026    COLORECTAL CANCER SCREENING  10/12/2027    ADVANCE CARE PLANNING  11/26/2029    HEPATITIS C SCREENING  Completed    PHQ-2 (once per calendar year)  Completed    Pneumococcal Vaccine: 65+ Years  Completed    ZOSTER IMMUNIZATION  Completed    RSV VACCINE  Completed    HPV IMMUNIZATION  Aged Out    MENINGITIS IMMUNIZATION  Aged Out    RSV MONOCLONAL ANTIBODY  Aged Out       A 10 organ systems ROS is negative other than any pertinent positives or negatives previously stated.      Objective    Exam  /80 (BP Location: Left arm, Patient Position: Sitting, Cuff Size: Adult Regular)   Pulse 71   Temp 97.1  F (36.2  C) (Tympanic)   Resp 16   Ht 1.803 m (5' 11\")   Wt 71.3 kg (157 lb 1.6 oz)   SpO2 99%   BMI 21.91 kg/m     Estimated body mass index is 21.91 kg/m  as calculated from the following:    Height as of this encounter: 1.803 m (5' 11\").    Weight as of this encounter: 71.3 kg (157 lb 1.6 oz).    Physical Exam  GENERAL: alert and no distress  EYES: Eyes grossly normal to inspection, PERRL and conjunctivae and sclerae normal  HENT: ear canals and TM's normal, nose and mouth without ulcers or lesions  NECK: no adenopathy, no asymmetry, masses, or scars  RESP: lungs clear to auscultation " - no rales, rhonchi or wheezes  CV: regular rate and rhythm, normal S1 S2, no S3 or S4, no murmur, click or rub, no peripheral edema  ABDOMEN: soft, nontender, no hepatosplenomegaly, no masses and bowel sounds normal  MS: no gross musculoskeletal defects noted, no edema  SKIN: no suspicious lesions or rashes  NEURO: Normal strength and tone, mentation intact and speech normal  PSYCH: mentation appears normal, affect normal/bright         No data to display                       Signed Electronically by: Jay Resendez MD

## 2024-11-26 NOTE — PATIENT INSTRUCTIONS
Patient Education   Preventive Care Advice   This is general advice given by our system to help you stay healthy. However, your care team may have specific advice just for you. Please talk to your care team about your preventive care needs.  Nutrition  Eat 5 or more servings of fruits and vegetables each day.  Try wheat bread, brown rice and whole grain pasta (instead of white bread, rice, and pasta).  Get enough calcium and vitamin D. Check the label on foods and aim for 100% of the RDA (recommended daily allowance).  Lifestyle  Exercise at least 150 minutes each week  (30 minutes a day, 5 days a week).  Do muscle strengthening activities 2 days a week. These help control your weight and prevent disease.  No smoking.  Wear sunscreen to prevent skin cancer.  Have a dental exam and cleaning every 6 months.  Yearly exams  See your health care team every year to talk about:  Any changes in your health.  Any medicines your care team has prescribed.  Preventive care, family planning, and ways to prevent chronic diseases.  Shots (vaccines)   HPV shots (up to age 26), if you've never had them before.  Hepatitis B shots (up to age 59), if you've never had them before.  COVID-19 shot: Get this shot when it's due.  Flu shot: Get a flu shot every year.  Tetanus shot: Get a tetanus shot every 10 years.  Pneumococcal, hepatitis A, and RSV shots: Ask your care team if you need these based on your risk.  Shingles shot (for age 50 and up)  General health tests  Diabetes screening:  Starting at age 35, Get screened for diabetes at least every 3 years.  If you are younger than age 35, ask your care team if you should be screened for diabetes.  Cholesterol test: At age 39, start having a cholesterol test every 5 years, or more often if advised.  Bone density scan (DEXA): At age 50, ask your care team if you should have this scan for osteoporosis (brittle bones).  Hepatitis C: Get tested at least once in your life.  STIs (sexually  transmitted infections)  Before age 24: Ask your care team if you should be screened for STIs.  After age 24: Get screened for STIs if you're at risk. You are at risk for STIs (including HIV) if:  You are sexually active with more than one person.  You don't use condoms every time.  You or a partner was diagnosed with a sexually transmitted infection.  If you are at risk for HIV, ask about PrEP medicine to prevent HIV.  Get tested for HIV at least once in your life, whether you are at risk for HIV or not.  Cancer screening tests  Cervical cancer screening: If you have a cervix, begin getting regular cervical cancer screening tests starting at age 21.  Breast cancer scan (mammogram): If you've ever had breasts, begin having regular mammograms starting at age 40. This is a scan to check for breast cancer.  Colon cancer screening: It is important to start screening for colon cancer at age 45.  Have a colonoscopy test every 10 years (or more often if you're at risk) Or, ask your provider about stool tests like a FIT test every year or Cologuard test every 3 years.  To learn more about your testing options, visit:   .  For help making a decision, visit:   https://bit.ly/zh56116.  Prostate cancer screening test: If you have a prostate, ask your care team if a prostate cancer screening test (PSA) at age 55 is right for you.  Lung cancer screening: If you are a current or former smoker ages 50 to 80, ask your care team if ongoing lung cancer screenings are right for you.  For informational purposes only. Not to replace the advice of your health care provider. Copyright   2023 [facility] [employer]. All rights reserved. Clinically reviewed by the [facility] Transitions Program. [employer] 236860 - REV 01/24.  Hearing Loss: Care Instructions  Overview     Hearing loss is a sudden or slow decrease in how well you hear. It can range from slight to profound. Permanent hearing loss can occur with aging. It also can  happen when you are exposed long-term to loud noise. Examples include listening to loud music, riding motorcycles, or being around other loud machines.  Hearing loss can affect your work and home life. It can make you feel lonely or depressed. You may feel that you have lost your independence. But hearing aids and other devices can help you hear better and feel connected to others.  Follow-up care is a key part of your treatment and safety. Be sure to make and go to all appointments, and call your doctor if you are having problems. It's also a good idea to know your test results and keep a list of the medicines you take.  How can you care for yourself at home?  Avoid loud noises whenever possible. This helps keep your hearing from getting worse.  Always wear hearing protection around loud noises.  Wear a hearing aid as directed.  A professional can help you pick a hearing aid that will work best for you.  You can also get hearing aids over the counter for mild to moderate hearing loss.  Have hearing tests as your doctor suggests. They can show whether your hearing has changed. Your hearing aid may need to be adjusted.  Use other devices as needed. These may include:  Telephone amplifiers and hearing aids that can connect to a television, stereo, radio, or microphone.  Devices that use lights or vibrations. These alert you to the doorbell, a ringing telephone, or a baby monitor.  Television closed-captioning. This shows the words at the bottom of the screen. Most new TVs can do this.  TTY (text telephone). This lets you type messages back and forth on the telephone instead of talking or listening. These devices are also called TDD. When messages are typed on the keyboard, they are sent over the phone line to a receiving TTY. The message is shown on a monitor.  Use text messaging, social media, and email if it is hard for you to communicate by telephone.  Try to learn a listening technique called speechreading. It is  "not lipreading. You pay attention to people's gestures, expressions, posture, and tone of voice. These clues can help you understand what a person is saying. Face the person you are talking to, and have them face you. Make sure the lighting is good. You need to see the other person's face clearly.  Think about counseling if you need help to adjust to your hearing loss.  When should you call for help?  Watch closely for changes in your health, and be sure to contact your doctor if:    You think your hearing is getting worse.     You have new symptoms, such as dizziness or nausea.   Where can you learn more?  Go to https://www.[employer].net/patiented  Enter R798 in the search box to learn more about \"Hearing Loss: Care Instructions.\"  Current as of: September 27, 2023  Content Version: 14.2 2024 [facility] [employer].   Care instructions adapted under license by your healthcare professional. If you have questions about a medical condition or this instruction, always ask your healthcare professional. [employer] disclaims any warranty or liability for your use of this information.       "

## 2024-11-27 LAB
ALBUMIN SERPL BCG-MCNC: 4.2 G/DL (ref 3.5–5.2)
ALP SERPL-CCNC: 79 U/L (ref 40–150)
ALT SERPL W P-5'-P-CCNC: 14 U/L (ref 0–70)
ANION GAP SERPL CALCULATED.3IONS-SCNC: 10 MMOL/L (ref 7–15)
AST SERPL W P-5'-P-CCNC: 24 U/L (ref 0–45)
BILIRUB SERPL-MCNC: 0.9 MG/DL
BUN SERPL-MCNC: 20 MG/DL (ref 8–23)
CALCIUM SERPL-MCNC: 9.2 MG/DL (ref 8.8–10.4)
CHLORIDE SERPL-SCNC: 105 MMOL/L (ref 98–107)
CHOLEST SERPL-MCNC: 216 MG/DL
CREAT SERPL-MCNC: 1.04 MG/DL (ref 0.67–1.17)
EGFRCR SERPLBLD CKD-EPI 2021: 76 ML/MIN/1.73M2
FASTING STATUS PATIENT QL REPORTED: YES
FASTING STATUS PATIENT QL REPORTED: YES
GLUCOSE SERPL-MCNC: 90 MG/DL (ref 70–99)
HCO3 SERPL-SCNC: 27 MMOL/L (ref 22–29)
HDLC SERPL-MCNC: 88 MG/DL
LDLC SERPL CALC-MCNC: 120 MG/DL
NONHDLC SERPL-MCNC: 128 MG/DL
POTASSIUM SERPL-SCNC: 4.9 MMOL/L (ref 3.4–5.3)
PROT SERPL-MCNC: 6.5 G/DL (ref 6.4–8.3)
PSA SERPL DL<=0.01 NG/ML-MCNC: <0.01 NG/ML (ref 0–6.5)
SODIUM SERPL-SCNC: 142 MMOL/L (ref 135–145)
TRIGL SERPL-MCNC: 39 MG/DL

## 2024-11-27 NOTE — RESULT ENCOUNTER NOTE
The following letter pertains to your most recent diagnostic tests:    -The cholesterol is a little bit better than last year.  However, the risk for atherosclerotic blood vessel disease is elevated.    -Your prostate specific antigen (PSA) test result returned normal.     -Liver and gallbladder tests are normal for you. (ALT,AST, Alk phos, bilirubin), kidney function is normal for you (Creatinine, GFR), Sodium is normal, Potassium is normal for you, Calcium is normal for you, Glucose (blood sugar) is normal for you.    -Your complete blood counts including your hemoglobin returned normal for you.       -Your hemoglobin A1c test which averages your blood sugars over the last 3 months returned normal.  No evidence for diabetes or prediabetes.           Bottom line:        Based on your current cholesterol levels and your other risk factors, I estimate a 18.2% statistical chance for you developing significant blood vessel blockage over the next 10 years.  We call this statistic a 10-year atherosclerotic coronary vascular disease (ASCVD) risk score.    Anyone with an ASCVD risk score greater than 7.5% is considered to have elevated risk for significant blood vessel blockage.    Since your ASCVD risk score is higher than 7.5%, we want to make sure we are doing everything possible to reduce your chances of developing significant blockage.      We believe that statin medications such as atorvastatin (Lipitor) have protective effects in the blood vessels that extend beyond their ability to simply lower blood cholesterol numbers.  We know statin medications prevent the accumulation of blockage in blood vessels and we also know that statin medication stabilize and shrink existing blood vessel blockages.     Current expert guidelines recommend starting statin medication therapy for individuals with ASCVD risk scores greater than 7.5%.  Therefore, I would recommend that you trying taking atorvastatin (Lipitor).     You should  be informed that a very small minority of people who take atorvastatin can develop muscle pain and weakness as a side effect from that drug.  If you experience those side effects, then stop the drug and contact us.      If you start the medication, you should have a follow up fasting cholesterol panel after you have been taking the medication for 1-2 months.  You can schedule a lab appointment for that purpose.      Please contact us if you would like to get that medication started so we can send a prescritpion and arrange for appropriate follow up.       If you would like to schedule the CT coronary artery calcium score that we discussed yesterday to understand the underlying risk for blood vessel disease better you can certainly do so.  If the CT coronary calcium score is 0, you could continue to work on lifestyle interventions to address blood vessel disease risk.  However, if the CT coronary calcium score is positive, I would recommend starting the atorvastatin (Lipitor) as described above.  Please call [facility] at [phone] to schedule a CT coronary calcium score.        Sincerely,    Dr. Resendez    The 10-year ASCVD risk score (Lauren FLORES, et al., 2019) is: 18.2%

## 2024-12-31 ENCOUNTER — NURSE TRIAGE (OUTPATIENT)
Dept: FAMILY MEDICINE | Facility: CLINIC | Age: 74
End: 2024-12-31
Payer: MEDICARE

## 2024-12-31 NOTE — TELEPHONE ENCOUNTER
Patient will test of covid and call if positive or new or worsening symptoms      Reason for Disposition   Cough    Additional Information   Negative: Choked on object of food that could be caught in the throat   Negative: Chest pain is main symptom   Negative: [1] Previous asthma attacks AND [2] this feels like asthma attack   Negative: Cough lasts > 3 weeks   Negative: Wet cough (productive; white-yellow, yellow, green, or kelsea colored sputum)   Negative: [1] Dry cough (non-productive;  no sputum or minimal clear sputum) AND [2] within 14 days of COVID-19 Exposure   Negative: [1] MODERATE difficulty breathing (e.g., speaks in phrases, SOB even at rest, pulse 100-120) AND [2] still present when not coughing   Negative: Chest pain  (Exception: MILD central chest pain, present only when coughing.)   Negative: Patient sounds very sick or weak to the triager   Negative: SEVERE coughing spells (e.g., whooping sound after coughing, vomiting after coughing)   Negative: [1] Continuous (nonstop) coughing interferes with work or school AND [2] no improvement using cough treatment per Care Advice   Negative: Earache is present   Negative: [1] Fever returns after gone for over 24 hours AND [2] symptoms worse or not improved   Negative: [1] Using nasal washes and pain medicine > 24 hours AND [2] sinus pain (around cheekbone or eye) persists   Negative: Fever present > 3 days (72 hours)   Negative: Cough has been present for > 3 weeks   Negative: [1] Nasal discharge AND [2] present > 10 days   Negative: [1] Coughed up blood-tinged sputum AND [2] more than once   Negative: [1] Patient also has allergy symptoms (e.g., itchy eyes, clear nasal discharge, postnasal drip) AND [2] they are acting up   Negative: Taking an ACE Inhibitor medicine (e.g., benazepril / LOTENSIN, captopril / CAPOTEN, enalapril / VASOTEC, lisinopril / ZESTRIL)   Negative: Exposure to TB (Tuberculosis)    Protocols used: Cough - Acute Non-Productive-A-AH

## 2025-05-24 ENCOUNTER — HEALTH MAINTENANCE LETTER (OUTPATIENT)
Age: 75
End: 2025-05-24

## (undated) RX ORDER — FENTANYL CITRATE 50 UG/ML
INJECTION, SOLUTION INTRAMUSCULAR; INTRAVENOUS
Status: DISPENSED
Start: 2017-10-12